# Patient Record
Sex: FEMALE | Race: WHITE | Employment: FULL TIME | URBAN - METROPOLITAN AREA
[De-identification: names, ages, dates, MRNs, and addresses within clinical notes are randomized per-mention and may not be internally consistent; named-entity substitution may affect disease eponyms.]

---

## 2017-10-18 ENCOUNTER — OP HISTORICAL/CONVERTED ENCOUNTER (OUTPATIENT)
Dept: OTHER | Age: 32
End: 2017-10-18

## 2017-11-01 ENCOUNTER — IP HISTORICAL/CONVERTED ENCOUNTER (OUTPATIENT)
Dept: OTHER | Age: 32
End: 2017-11-01

## 2017-11-20 ENCOUNTER — OFFICE VISIT (OUTPATIENT)
Dept: ENDOCRINOLOGY | Age: 32
End: 2017-11-20

## 2017-11-20 VITALS
WEIGHT: 156 LBS | DIASTOLIC BLOOD PRESSURE: 85 MMHG | HEIGHT: 65 IN | SYSTOLIC BLOOD PRESSURE: 127 MMHG | TEMPERATURE: 97.5 F | HEART RATE: 80 BPM | BODY MASS INDEX: 25.99 KG/M2 | RESPIRATION RATE: 16 BRPM | OXYGEN SATURATION: 97 %

## 2017-11-20 DIAGNOSIS — C73 THYROID CANCER (HCC): Primary | ICD-10-CM

## 2017-11-20 NOTE — MR AVS SNAPSHOT
Visit Information Date & Time Provider Department Dept. Phone Encounter #  
 11/20/2017 11:00 AM Earnest Anderson MD [facility] [phone] [id number] Upcoming Health Maintenance Date Due DTaP/Tdap/Td series (1 - Tdap) 11/6/2008 PAP AKA CERVICAL CYTOLOGY 11/6/2008 Influenza Age 5 to Adult 8/1/2017 Allergies as of 11/20/2017  Review Complete On: 11/20/2017 By: Earnest Anderson MD  
  
 Severity Noted Reaction Type Reactions Latex High 11/20/2017    Hives Bactrim [Sulfamethoprim] High 11/20/2017    Other (comments)  
 unresponsive Morphine High 11/20/2017    Nausea and Vomiting Current Immunizations  Never Reviewed No immunizations on file. Not reviewed this visit You Were Diagnosed With   
  
 Codes Comments [facility]    -  Primary ICD-10-CM: W80 ICD-9-CM: 276 Vitals BP Pulse Temp Resp Height(growth percentile) Weight(growth percentile) 127/85 (BP 1 Location: Right arm, BP Patient Position: Sitting) 80 97.5 °F (36.4 °C) (Oral) 16 5' 5\" (1.651 m) 156 lb (70.8 kg) SpO2 BMI OB Status Smoking Status 97% 25.96 kg/m2 Recent pregnancy Current Every Day Smoker Vitals History BMI and BSA Data Body Mass Index Body Surface Area  
 25.96 kg/m 2 1.8 m 2 Preferred Pharmacy Pharmacy Name Phone [id number] [address] [place] [address] [phone] Your Updated Medication List  
  
   
This list is accurate as of: 11/20/17 11:53 AM.  Always use your most recent med list.  
  
  
  
  
 PRENATAL AD PO Take  by mouth. Patient Instructions Synthroid   ? ???  mcg  A day, on empty stomach with water only, no other meds or food or drinks   For next half hour Take any kind of vitamins, calcium, iron   Pills  4 hours later Introducing [facility] & HEALTH SERVICES!    
 [employer] introduces [employer] patient portal. Now you can access parts of your medical record, email your doctor's office, and request medication refills online. 1. In your internet browser, go to [email or web address] 2. Click on the First Time User? Click Here link in the Sign In box. You will see the New Member Sign Up page. 3. Enter your [employer] Access Code exactly as it appears below. You will not need to use this code after youve completed the sign-up process. If you do not sign up before the expiration date, you must request a new code. · [employer] Access Code: [email or web address] Expires: 2/18/2018 11:53 AM 
 
4. Enter the last four digits of your Social Security Number (xxxx) and Date of Birth (mm/dd/yyyy) as indicated and click Submit. You will be taken to the next sign-up page. 5. Create a [employer] ID. This will be your [employer] login ID and cannot be changed, so think of one that is secure and easy to remember. 6. Create a [employer] password. You can change your password at any time. 7. Enter your Password Reset Question and Answer. This can be used at a later time if you forget your password. 8. Enter your e-mail address. You will receive e-mail notification when new information is available in [address] 9. Click Sign Up. You can now view and download portions of your medical record. 10. Click the Download Summary menu link to download a portable copy of your medical information. If you have questions, please visit the Frequently Asked Questions section of the [employer] website. Remember, [employer] is NOT to be used for urgent needs. For medical emergencies, dial 911. Now available from your iPhone and Android! Please provide this summary of care documentation to your next provider. If you have any questions after today's visit, please call [phone].

## 2017-11-20 NOTE — PATIENT INSTRUCTIONS
Synthroid   ? ???  mcg  A day, on empty stomach with water only, no other meds or food or drinks   For next half hour       Take any kind of vitamins, calcium, iron   Pills  4 hours later

## 2017-11-20 NOTE — PROGRESS NOTES
Chief Complaint   Patient presents with    Thyroid Problem     1. Have you been to the ER, urgent care clinic since your last visit? Hospitalized since your last visit? Yes, Child birth, 11/1/17, [facility]  2.  your last visit? Include any pap smears or colon screening.  No     Wt Readings from Last 3 Encounters:   11/20/17 156 lb (70.8 kg)     Temp Readings from Last 3 Encounters:   11/20/17 97.5 °F (36.4 °C) (Oral)     BP Readings from Last 3 Encounters:   11/20/17 127/85     Pulse Readings from Last 3 Encounters:   11/20/17 80

## 2017-11-20 NOTE — PROGRESS NOTES
HISTORY OF PRESENT ILLNESS  Baltazar Garvin is a 29 y.o. female. HPI  Initial visit for thyroid nodule / cancer management     She was found to have the nodule in nov 2016   She had biopsy on it which showed papillary thyroid cancer     Pt is accompanied by her . They brought in a flash drive with medical information  Path was not available on my review    She had biospies on LN , which are enlarged bilaterally, all of them are negative for Thyroglobulin   Suggested surgery in second trimester, but got postponed     She moved from [place] to [place] in July last week   She had her baby in Nov 1 st 2017     She is going in for surgery on DEC 5th by Dr. Yojana Cm   She is currently BREAST FEEDING         Past Medical History:   Diagnosis Date    Thyroid cancer [facility])        Social History     Social History    Marital status:      Spouse name: N/A    Number of children: N/A    Years of education: N/A     Occupational History    Not on file. Social History Main Topics    Smoking status: Current Every Day Smoker     Packs/day: 1.00    Smokeless tobacco: Never Used    Alcohol use No    Drug use: No    Sexual activity: Not on file     Other Topics Concern    Not on file     Social History Narrative    No narrative on file       Family History   Problem Relation Age of Onset    Ulcerative Colitis Mother     Diabetes Father     Hypertension Father     Elevated Lipids Father     Hypertension Maternal Grandmother     Cancer Maternal Grandmother     Cancer Maternal Grandfather            Review of Systems   Constitutional: Negative. HENT: Negative. Eyes: Negative for pain and redness. Respiratory: Negative. Cardiovascular: Negative for chest pain, palpitations and leg swelling. Gastrointestinal: Negative. Negative for constipation. Genitourinary: Negative. Musculoskeletal: Negative for myalgias. Skin: Negative. Neurological: Negative. Endo/Heme/Allergies: Negative. Psychiatric/Behavioral: Negative for depression and memory loss. The patient does not have insomnia. Physical Exam   Constitutional: She is oriented to person, place, and time. She appears well-developed and well-nourished. HENT:   Head: Normocephalic. Eyes: Conjunctivae and EOM are normal. Pupils are equal, round, and reactive to light. Neck: Normal range of motion. Neck supple. No JVD present. No tracheal deviation present. Thyromegaly (lymphnodes palpable too ) present. Cardiovascular: Normal rate, regular rhythm and normal heart sounds. No murmur heard. Pulmonary/Chest: Breath sounds normal.   Abdominal: Soft. Bowel sounds are normal.   Musculoskeletal: Normal range of motion. Lymphadenopathy:     She has no cervical adenopathy. Neurological: She is alert and oriented to person, place, and time. She has normal reflexes. Skin: Skin is warm. Psychiatric: She has a normal mood and affect. ASSESSMENT and PLAN    1. Thyroid cancer , Right side nodule  Of 3.3 cm by 1.8 cm by 1.8 cm, has increased vascularity and increased microcalcifications   Had FNA on nov 22 2016 -  PTC ( could nto review the path)  Had ct neck  Dec 27 2106 and usg reportes reviewed from oct 3 2016 and dec 1 2016     had FNA with TG wash out on bilateral lateral Level III  Lymph nodes - negative for cancer at [facility]     Going for total thyroidectomy with central compartment LN removal , prophylactic on Dec 5 2017 by Dr. Aurea Bernal for GRAY therapy or ablation because of breast feeding status   Will be starting on suppressive dose of levothyroxine     Will do further care based on surg path, risk of recurrence after she stops breast feeding   The management plan is discussed with her and her      Discussed possibility of hypocaclemia post surgery       2.  Family h/o  Thyroid cancer in maternal grandmother       F/u after surgery   > 50 % of time is spent on counseling   Patient voiced understanding her plan of care

## 2017-11-20 NOTE — LETTER
11/27/2017 9:59 AM 
 
Patient:  Prasanna Connor YOB: 1985 Date of Visit: 11/20/2017 Dear Farooq Drake, [address] [place] [id number] VIA Facsimile: [phone] 
 : 
 
 
Thank you for referring Ms. Prasanna Connor to me for evaluation/treatment. Below are the relevant portions of my assessment and plan of care. Chief Complaint Patient presents with  Thyroid Problem 1. Have you been to the ER, urgent care clinic since your last visit? Hospitalized since your last visit? Yes, Child birth, 11/1/17, [facility] 
2.  your last visit? Include any pap smears or colon screening. No  
 
Wt Readings from Last 3 Encounters:  
11/20/17 156 lb (70.8 kg) Temp Readings from Last 3 Encounters:  
11/20/17 97.5 °F (36.4 °C) (Oral) BP Readings from Last 3 Encounters:  
11/20/17 127/85 Pulse Readings from Last 3 Encounters:  
11/20/17 80 HISTORY OF PRESENT ILLNESS Prasanna Connor is a 29 y.o. female. HPI Initial visit for thyroid nodule / cancer management She was found to have the nodule in nov 2016 She had biopsy on it which showed papillary thyroid cancer Pt is accompanied by her . They brought in a flash drive with medical information Path was not available on my review She had biospies on LN , which are enlarged bilaterally, all of them are negative for Thyroglobulin Suggested surgery in second trimester, but got postponed She moved from [place] to [place] in July last week She had her baby in Nov 1 st 2017 She is going in for surgery on DEC 5th by Dr. Leigh Delgado  
She is currently BREAST FEEDING Past Medical History:  
Diagnosis Date  Thyroid cancer ([address] Social History Social History  Marital status:  Spouse name: N/A  
 Number of children: N/A  
 Years of education: N/A Occupational History  Not on file. Social History Main Topics  Smoking status: Current Every Day Smoker Packs/day: 1.00  Smokeless tobacco: Never Used  Alcohol use No  
 Drug use: No  
 Sexual activity: Not on file Other Topics Concern  Not on file Social History Narrative  No narrative on file Family History Problem Relation Age of Onset  Ulcerative Colitis Mother  Diabetes Father  Hypertension Father  Elevated Lipids Father  Hypertension Maternal Grandmother  Cancer Maternal Grandmother  Cancer Maternal Grandfather Review of Systems Constitutional: Negative. HENT: Negative. Eyes: Negative for pain and redness. Respiratory: Negative. Cardiovascular: Negative for chest pain, palpitations and leg swelling. Gastrointestinal: Negative. Negative for constipation. Genitourinary: Negative. Musculoskeletal: Negative for myalgias. Skin: Negative. Neurological: Negative. Endo/Heme/Allergies: Negative. Psychiatric/Behavioral: Negative for depression and memory loss. The patient does not have insomnia. Physical Exam  
Constitutional: She is oriented to person, place, and time. She appears well-developed and well-nourished. HENT:  
Head: Normocephalic. Eyes: Conjunctivae and EOM are normal. Pupils are equal, round, and reactive to light. Neck: Normal range of motion. Neck supple. No JVD present. No tracheal deviation present. Thyromegaly (lymphnodes palpable too ) present. Cardiovascular: Normal rate, regular rhythm and normal heart sounds. No murmur heard. Pulmonary/Chest: Breath sounds normal.  
Abdominal: Soft. Bowel sounds are normal.  
Musculoskeletal: Normal range of motion. Lymphadenopathy:  
  She has no cervical adenopathy. Neurological: She is alert and oriented to person, place, and time. She has normal reflexes. Skin: Skin is warm. Psychiatric: She has a normal mood and affect.   
 
 
ASSESSMENT and PLAN 
 
 1.  Thyroid cancer , Right side nodule  Of 3.3 cm by 1.8 cm by 1.8 cm, has increased vascularity and increased microcalcifications Had FNA on nov 22 2016 -  PTC ( could nto review the path) Had ct neck  Dec 27 2106 and usg reportes reviewed from oct 3 2016 and dec 1 2016  
 
had FNA with TG wash out on bilateral lateral Level III  Lymph nodes - negative for cancer at [facility] Going for total thyroidectomy with central compartment LN removal , prophylactic on Dec 5 2017 by Dr. Gaviota Dunn for GRAY therapy or ablation because of breast feeding status Will be starting on suppressive dose of levothyroxine Will do further care based on surg path, risk of recurrence after she stops breast feeding The management plan is discussed with her and her  Discussed possibility of hypocaclemia post surgery 2. Family h/o  Thyroid cancer in maternal grandmother F/u after surgery  
> 50 % of time is spent on counseling Patient voiced understanding her plan of care If you have questions, please do not hesitate to call me. I look forward to following Ms. Caldwell along with you. Sincerely, Dominique Solis MD

## 2017-12-04 ENCOUNTER — OP HISTORICAL/CONVERTED ENCOUNTER (OUTPATIENT)
Dept: OTHER | Age: 32
End: 2017-12-04

## 2017-12-05 ENCOUNTER — OP HISTORICAL/CONVERTED ENCOUNTER (OUTPATIENT)
Dept: OTHER | Age: 32
End: 2017-12-05

## 2017-12-12 ENCOUNTER — ED HISTORICAL/CONVERTED ENCOUNTER (OUTPATIENT)
Dept: OTHER | Age: 32
End: 2017-12-12

## 2018-01-05 ENCOUNTER — OFFICE VISIT (OUTPATIENT)
Dept: ENDOCRINOLOGY | Age: 33
End: 2018-01-05

## 2018-01-05 VITALS
HEART RATE: 70 BPM | BODY MASS INDEX: 26.08 KG/M2 | OXYGEN SATURATION: 96 % | DIASTOLIC BLOOD PRESSURE: 85 MMHG | SYSTOLIC BLOOD PRESSURE: 134 MMHG | WEIGHT: 156.5 LBS | RESPIRATION RATE: 16 BRPM | TEMPERATURE: 97.8 F | HEIGHT: 65 IN

## 2018-01-05 DIAGNOSIS — E89.0 POSTOPERATIVE HYPOTHYROIDISM: Primary | ICD-10-CM

## 2018-01-05 DIAGNOSIS — C73 THYROID CANCER (HCC): ICD-10-CM

## 2018-01-05 RX ORDER — LEVOTHYROXINE SODIUM 100 UG/1
TABLET ORAL
Refills: 0 | COMMUNITY
Start: 2017-11-22 | End: 2018-01-05 | Stop reason: DRUGHIGH

## 2018-01-05 RX ORDER — LEVOTHYROXINE SODIUM 125 UG/1
TABLET ORAL
Refills: 0 | COMMUNITY
Start: 2017-12-13 | End: 2018-01-05 | Stop reason: SDUPTHER

## 2018-01-05 RX ORDER — LEVOTHYROXINE SODIUM 125 UG/1
TABLET ORAL
Qty: 30 TAB | Refills: 6 | Status: SHIPPED | OUTPATIENT
Start: 2018-01-05 | End: 2018-01-08 | Stop reason: DRUGHIGH

## 2018-01-05 RX ORDER — DULOXETIN HYDROCHLORIDE 20 MG/1
20 CAPSULE, DELAYED RELEASE ORAL DAILY
COMMUNITY
End: 2018-06-19 | Stop reason: ALTCHOICE

## 2018-01-05 NOTE — PROGRESS NOTES
HISTORY OF PRESENT ILLNESS  Anna Dee is a 29 y.o. female. HPI  First f/u after initial visit for thyroid nodule / cancer management  From nov 2017     S/p surgery on --  Total thyroidectomy  Dec  5 2017     Started on synthroid 125 mcg post-op    still breast feeding     Hair loss is better per her after surgery          Old history :       She was found to have the nodule in nov 2016   She had biopsy on it which showed papillary thyroid cancer     Pt is accompanied by her . They brought in a flash drive with medical information  Path was not available on my review    She had biospies on LN , which are enlarged bilaterally, all of them are negative for Thyroglobulin   Suggested surgery in second trimester, but got postponed     She moved from [place] to [place] in July last week   She had her baby in Nov 1 st 2017     She is going in for surgery on DEC 5th by Dr. Shobha Villela   She is currently BREAST FEEDING         Past Medical History:   Diagnosis Date    Thyroid cancer [facility])        Social History     Social History    Marital status:      Spouse name: N/A    Number of children: N/A    Years of education: N/A     Occupational History    Not on file. Social History Main Topics    Smoking status: Current Every Day Smoker     Packs/day: 1.00    Smokeless tobacco: Never Used    Alcohol use No    Drug use: No    Sexual activity: Not on file     Other Topics Concern    Not on file     Social History Narrative       Family History   Problem Relation Age of Onset    Ulcerative Colitis Mother     Diabetes Father     Hypertension Father     Elevated Lipids Father     Hypertension Maternal Grandmother     Cancer Maternal Grandmother     Cancer Maternal Grandfather            Review of Systems   Constitutional: Negative. HENT: Negative. Eyes: Negative for pain and redness. Respiratory: Negative. Cardiovascular: Negative for chest pain, palpitations and leg swelling.    Gastrointestinal: Negative. Negative for constipation. Genitourinary: Negative. Musculoskeletal: Negative for myalgias. Skin: Negative. Neurological: Negative. Endo/Heme/Allergies: Negative. Psychiatric/Behavioral: Negative for depression and memory loss. The patient does not have insomnia. Physical Exam   Constitutional: She is oriented to person, place, and time. She appears well-developed and well-nourished. HENT:   Head: Normocephalic. Eyes: Conjunctivae and EOM are normal. Pupils are equal, round, and reactive to light. Neck: Normal range of motion. Neck supple. No JVD present. No tracheal deviation present. Thyromegaly (lymphnodes palpable too ) present. Cardiovascular: Normal rate, regular rhythm and normal heart sounds. No murmur heard. Pulmonary/Chest: Breath sounds normal.   Abdominal: Soft. Bowel sounds are normal.   Musculoskeletal: Normal range of motion. Lymphadenopathy:     She has no cervical adenopathy. Neurological: She is alert and oriented to person, place, and time. She has normal reflexes. Skin: Skin is warm. Psychiatric: She has a normal mood and affect. ASSESSMENT and PLAN    1. Hypothyroidism : post-operative   Started on synthroid 125 mcg on Dec 11 th   Will do labs and advise her on phone         2.  Thyroid cancer , Right side nodule  Of 3.3 cm by 1.8 cm by 1.8 cm, has increased vascularity and increased microcalcifications   Had FNA on nov 22 2016 -  PTC ( could nto review the path from old records)  Had ct neck  Dec 27 2106 and usg reportes reviewed from oct 3 2016 and dec 1 2016   had FNA with TG wash out on bilateral lateral Level III  Lymph nodes - negative for cancer at [facility]     s/p total thyroidectomy  on Dec 5 2017 by Dr. Deborah Mishra for GRAY therapy or ablation because of breast feeding status    started on suppressive dose of levothyroxine 125 mcg dose   Will get labs today     She did nto have any  hypocaclemia issues post surgery       2.  Family h/o  Thyroid cancer in maternal grandmother       F/u in 2-3 months   > 50 % of time is spent on counseling   Patient voiced understanding her plan of care

## 2018-01-05 NOTE — PROGRESS NOTES
Chief Complaint   Patient presents with    Thyroid Problem     1. Have you been to the ER, urgent care clinic since your last visit? Hospitalized since your last visit? No    2. Have you seen or consulted any other health care providers outside of the [facility] since your last visit? Include any pap smears or colon screening.  No     Wt Readings from Last 3 Encounters:   01/05/18 156 lb 8 oz (71 kg)   11/20/17 156 lb (70.8 kg)     Temp Readings from Last 3 Encounters:   01/05/18 97.8 °F (36.6 °C) (Oral)   11/20/17 97.5 °F (36.4 °C) (Oral)     BP Readings from Last 3 Encounters:   01/05/18 134/85   11/20/17 127/85     Pulse Readings from Last 3 Encounters:   01/05/18 70   11/20/17 80

## 2018-01-05 NOTE — PATIENT INSTRUCTIONS
Synthroid  125  mcg  A day, on empty stomach with water only, no other meds or food or drinks   For next half hour       Take any kind of vitamins, calcium, iron   Pills  4 hours later

## 2018-01-05 NOTE — MR AVS SNAPSHOT
Visit Information Date & Time Provider Department Dept. Phone Encounter #  
 1/5/2018 10:45 AM Monica Hastings MD [facility] Endocrinology [phone] [id number] Follow-up Instructions Return in about 2 months (around 3/19/2018). Upcoming Health Maintenance Date Due Pneumococcal 19-64 Highest Risk (1 of 3 - PCV13) 11/6/2006 DTaP/Tdap/Td series (1 - Tdap) 11/6/2008 PAP AKA CERVICAL CYTOLOGY 11/6/2008 Influenza Age 5 to Adult 8/1/2017 Allergies as of 1/5/2018  Review Complete On: 1/5/2018 By: Monica Hastings MD  
  
 Severity Noted Reaction Type Reactions Latex High 11/20/2017    Hives Bactrim [Sulfamethoprim] High 11/20/2017    Other (comments)  
 unresponsive Morphine High 11/20/2017    Nausea and Vomiting Current Immunizations  Never Reviewed No immunizations on file. Not reviewed this visit You Were Diagnosed With   
  
 Codes Comments Postoperative hypothyroidism    -  Primary ICD-10-CM: E89.0 ICD-9-CM: 244.0 Vitals BP Pulse Temp Resp Height(growth percentile) Weight(growth percentile) 134/85 (BP 1 Location: Right arm, BP Patient Position: Sitting) 70 97.8 °F (36.6 °C) (Oral) 16 5' 5\" (1.651 m) 156 lb 8 oz (71 kg) SpO2 BMI OB Status Smoking Status 96% 26.04 kg/m2 Recent pregnancy Current Every Day Smoker BMI and BSA Data Body Mass Index Body Surface Area 26.04 kg/m 2 1.8 m 2 Preferred Pharmacy Pharmacy Name Phone [id number] [address] [place] [address] [address] [place] [address] [phone] Your Updated Medication List  
  
   
This list is accurate as of: 1/5/18 11:34 AM.  Always use your most recent med list.  
  
  
  
  
 CYMBALTA 20 mg capsule Generic drug:  DULoxetine Take 20 mg by mouth daily. PRENATAL AD PO Take  by mouth. We Performed the Following METABOLIC PANEL, COMPREHENSIVE [81992 CPT(R)] T4, FREE [id number] CPT(R)] TSH 3RD GENERATION [66552 CPT(R)] Follow-up Instructions Return in about 2 months (around 3/19/2018). Patient Instructions Synthroid  125  mcg  A day, on empty stomach with water only, no other meds or food or drinks   For next half hour Take any kind of vitamins, calcium, iron   Pills  4 hours later Introducing [facility] & HEALTH SERVICES! [employer] introduces [employer] patient portal. Now you can access parts of your medical record, email your doctor's office, and request medication refills online. 1. In your internet browser, go to [email or web address] 2. Click on the First Time User? Click Here link in the Sign In box. You will see the New Member Sign Up page. 3. Enter your [employer] Access Code exactly as it appears below. You will not need to use this code after youve completed the sign-up process. If you do not sign up before the expiration date, you must request a new code. · [employer] Access Code: [email or web address] Expires: 2/18/2018 11:53 AM 
 
4. Enter the last four digits of your Social Security Number (xxxx) and Date of Birth (mm/dd/yyyy) as indicated and click Submit. You will be taken to the next sign-up page. 5. Create a [employer] ID. This will be your [employer] login ID and cannot be changed, so think of one that is secure and easy to remember. 6. Create a [employer] password. You can change your password at any time. 7. Enter your Password Reset Question and Answer. This can be used at a later time if you forget your password. 8. Enter your e-mail address. You will receive e-mail notification when new information is available in [address] 9. Click Sign Up. You can now view and download portions of your medical record. 10. Click the Download Summary menu link to download a portable copy of your medical information.  
 
If you have questions, please visit the Frequently Asked Questions section of the Index. Remember, [employer] is NOT to be used for urgent needs. For medical emergencies, dial 911. Now available from your iPhone and Android! Please provide this summary of care documentation to your next provider. Your primary care clinician is listed as Corina Davison. If you have any questions after today's visit, please call [phone].

## 2018-01-06 LAB
ALBUMIN SERPL-MCNC: 4.7 G/DL (ref 3.5–5.5)
ALBUMIN/GLOB SERPL: 1.7 {RATIO} (ref 1.2–2.2)
ALP SERPL-CCNC: 69 IU/L (ref 39–117)
ALT SERPL-CCNC: 16 IU/L (ref 0–32)
AST SERPL-CCNC: 17 IU/L (ref 0–40)
BILIRUB SERPL-MCNC: 0.2 MG/DL (ref 0–1.2)
BUN SERPL-MCNC: 8 MG/DL (ref 6–20)
BUN/CREAT SERPL: 13 (ref 9–23)
CALCIUM SERPL-MCNC: 9.4 MG/DL (ref 8.7–10.2)
CHLORIDE SERPL-SCNC: 101 MMOL/L (ref 96–106)
CO2 SERPL-SCNC: 24 MMOL/L (ref 18–29)
CREAT SERPL-MCNC: 0.62 MG/DL (ref 0.57–1)
GLOBULIN SER CALC-MCNC: 2.7 G/DL (ref 1.5–4.5)
GLUCOSE SERPL-MCNC: 82 MG/DL (ref 65–99)
POTASSIUM SERPL-SCNC: 4.3 MMOL/L (ref 3.5–5.2)
PROT SERPL-MCNC: 7.4 G/DL (ref 6–8.5)
SODIUM SERPL-SCNC: 142 MMOL/L (ref 134–144)
T4 FREE SERPL-MCNC: 1.19 NG/DL (ref 0.82–1.77)
TSH SERPL DL<=0.005 MIU/L-ACNC: 13.28 UIU/ML (ref 0.45–4.5)

## 2018-01-08 DIAGNOSIS — E89.0 POSTOPERATIVE HYPOTHYROIDISM: Primary | ICD-10-CM

## 2018-01-08 RX ORDER — LEVOTHYROXINE SODIUM 150 UG/1
150 TABLET ORAL
Qty: 30 TAB | Refills: 6 | Status: SHIPPED | OUTPATIENT
Start: 2018-01-08 | End: 2018-01-10 | Stop reason: DRUGHIGH

## 2018-01-10 DIAGNOSIS — E89.0 POSTSURGICAL HYPOTHYROIDISM: Primary | ICD-10-CM

## 2018-01-10 RX ORDER — LEVOTHYROXINE SODIUM 150 UG/1
150 TABLET ORAL
Qty: 30 TAB | Refills: 6 | Status: SHIPPED | OUTPATIENT
Start: 2018-01-10 | End: 2018-06-19 | Stop reason: SDUPTHER

## 2018-01-10 NOTE — TELEPHONE ENCOUNTER
Spoke with patient and informed her of lab results and dose change to medication she verbalized understanding.

## 2018-01-10 NOTE — TELEPHONE ENCOUNTER
----- Message from Cliff Jose MD sent at 1/8/2018  3:43 PM EST -----  Raising the synthrodi dose to 150 mcg a day   Stopping 125 mcg dose - inform pt please

## 2018-03-20 ENCOUNTER — OFFICE VISIT (OUTPATIENT)
Dept: ENDOCRINOLOGY | Age: 33
End: 2018-03-20

## 2018-03-20 VITALS
HEIGHT: 65 IN | WEIGHT: 149.7 LBS | RESPIRATION RATE: 20 BRPM | TEMPERATURE: 97.9 F | HEART RATE: 77 BPM | BODY MASS INDEX: 24.94 KG/M2 | SYSTOLIC BLOOD PRESSURE: 109 MMHG | OXYGEN SATURATION: 100 % | DIASTOLIC BLOOD PRESSURE: 77 MMHG

## 2018-03-20 DIAGNOSIS — C73 THYROID CANCER (HCC): Primary | ICD-10-CM

## 2018-03-20 DIAGNOSIS — E89.0 POSTOPERATIVE HYPOTHYROIDISM: ICD-10-CM

## 2018-03-20 RX ORDER — IBUPROFEN 800 MG/1
TABLET ORAL AS NEEDED
Refills: 0 | COMMUNITY
Start: 2018-03-19 | End: 2021-01-19 | Stop reason: ALTCHOICE

## 2018-03-20 NOTE — PATIENT INSTRUCTIONS
Synthroid  150  mcg  A day, on empty stomach with water only, no other meds or food or drinks   For next half hour       Take any kind of vitamins, calcium, iron   Pills  4 hours later

## 2018-03-20 NOTE — PROGRESS NOTES
HISTORY OF PRESENT ILLNESS  Sagar Chanel is a 29 y.o. female. HPI  f/u after last  visit for thyroid nodule / cancer management  From jan 5 2018     Lost 7 lbs     Stopped breast feeding   Increase synthroid dose to 150 mcg on vianney 10 2018     Pt denies any family h/o thyroid problems   She denies any hyper or hypothyroid symptoms     She denies any prior irradiations or exposure to contrast             Prior history :    S/p surgery on --  Total thyroidectomy  Dec  5 2017     Started on synthroid 125 mcg post-op    still breast feeding     Hair loss is better per her after surgery          Old history :       She was found to have the nodule in nov 2016   She had biopsy on it which showed papillary thyroid cancer     Pt is accompanied by her . They brought in a flash drive with medical information  Path was not available on my review    She had biospies on LN , which are enlarged bilaterally, all of them are negative for Thyroglobulin   Suggested surgery in second trimester, but got postponed     She moved from [place] to [place] in July last week   She had her baby in Nov 1 st 2017     She is going in for surgery on DEC 5th by Dr. Ji Fuentes   She is currently BREAST FEEDING         Past Medical History:   Diagnosis Date    Thyroid cancer [facility])        Social History     Social History    Marital status:      Spouse name: N/A    Number of children: N/A    Years of education: N/A     Occupational History    Not on file.      Social History Main Topics    Smoking status: Current Every Day Smoker     Packs/day: 1.00    Smokeless tobacco: Never Used    Alcohol use No    Drug use: No    Sexual activity: Not on file     Other Topics Concern    Not on file     Social History Narrative       Family History   Problem Relation Age of Onset    Ulcerative Colitis Mother     Diabetes Father     Hypertension Father     Elevated Lipids Father     Hypertension Maternal Grandmother     Cancer Maternal Grandmother  Cancer Maternal Grandfather            Review of Systems   Constitutional: Negative. HENT: Negative. Eyes: Negative for pain and redness. Respiratory: Negative. Cardiovascular: Negative for chest pain, palpitations and leg swelling. Gastrointestinal: Negative. Negative for constipation. Genitourinary: Negative. Musculoskeletal: Negative for myalgias. Skin: Negative. Neurological: Negative. Endo/Heme/Allergies: Negative. Psychiatric/Behavioral: Negative for depression and memory loss. The patient does not have insomnia. Physical Exam   Constitutional: She is oriented to person, place, and time. She appears well-developed and well-nourished. HENT:   Head: Normocephalic. Eyes: Conjunctivae and EOM are normal. Pupils are equal, round, and reactive to light. Neck: Normal range of motion. Neck supple. No JVD present. No tracheal deviation present. Thyromegaly (lymphnodes palpable too ) present. Cardiovascular: Normal rate, regular rhythm and normal heart sounds. No murmur heard. Pulmonary/Chest: Breath sounds normal.   Abdominal: Soft. Bowel sounds are normal.   Musculoskeletal: Normal range of motion. Lymphadenopathy:     She has no cervical adenopathy. Neurological: She is alert and oriented to person, place, and time. She has normal reflexes. Skin: Skin is warm. Psychiatric: She has a normal mood and affect. Lab Results  Component Value Date/Time   TSH 0.950 03/13/2018 02:27 PM   T4, Free 1.68 03/13/2018 02:27 PM            ASSESSMENT and PLAN    1. Hypothyroidism : post-operative   Started on synthroid 125 mcg on Dec 11 th   Increased the dose to  150 mcg on jan 10th       2.  Thyroid cancer , Right side nodule  Of 3.3 cm by 1.8 cm by 1.8 cm, has increased vascularity and increased microcalcifications   Had FNA on nov 22 2016 -  PTC ( could nto review the path from old records)  Had ct neck  Dec 27 2106 and usg reportes reviewed from oct 3 2016 and dec 1 2016   had FNA with TG wash out on bilateral lateral Level III  Lymph nodes - negative for cancer at [facility]     s/p total thyroidectomy  on Dec 5 2017 by Dr. Everette Hadley   Reviewed surgical pathology :  Tumor is multifocal ( right lobe 2.5 cm and isthmus 1 mm )  Usual PTC features     Did not  plan for GRAY therapy or ablation because of breast feeding status   Now, she has stopped breast feeding      started on suppressive dose of levothyroxine 125 mcg dose, increased to 150 mcg dose   And  She has suppressed TG marker level, TSH of 0.9     Given that pt has all  features of  LOW risk for recurrence, will not pursue GRAY ablation therapy and pt is agreeable to it       2.  Family h/o  Thyroid cancer in maternal grandmother       F/u in 2-3 months   > 50 % of time is spent on counseling   Patient voiced understanding her plan of care

## 2018-03-20 NOTE — PROGRESS NOTES
Wt Readings from Last 3 Encounters:   03/20/18 149 lb 11.2 oz (67.9 kg)   01/05/18 156 lb 8 oz (71 kg)   11/20/17 156 lb (70.8 kg)     Temp Readings from Last 3 Encounters:   03/20/18 97.9 °F (36.6 °C) (Oral)   01/05/18 97.8 °F (36.6 °C) (Oral)   11/20/17 97.5 °F (36.4 °C) (Oral)     BP Readings from Last 3 Encounters:   03/20/18 109/77   01/05/18 134/85   11/20/17 127/85     Pulse Readings from Last 3 Encounters:   03/20/18 77   01/05/18 70   11/20/17 80

## 2018-03-20 NOTE — MR AVS SNAPSHOT
[address] [address] 
[phone] Patient: Johana Pan MRN: [id number] :1985 Visit Information Date & Time Provider Department Dept. Phone Encounter #  
 3/20/2018 11:00 AM Marcus Delgado MD [facility] Diabetes & Endocrinology [phone] [id number] Follow-up Instructions Return in about 3 months (around 2018). Upcoming Health Maintenance Date Due Pneumococcal 19-64 Highest Risk (1 of 3 - PCV13) 2006 DTaP/Tdap/Td series (1 - Tdap) 2008 PAP AKA CERVICAL CYTOLOGY 2008 Influenza Age 5 to Adult 2017 Allergies as of 3/20/2018  Review Complete On: 3/20/2018 By: Marcus Delgado MD  
  
 Severity Noted Reaction Type Reactions Latex High 2017    Hives Bactrim [Sulfamethoprim] High 2017    Other (comments)  
 unresponsive Morphine High 2017    Nausea and Vomiting Current Immunizations  Never Reviewed No immunizations on file. Not reviewed this visit You Were Diagnosed With   
  
 Codes Comments [facility]    -  Primary ICD-10-CM: Z35 ICD-9-CM: 611 Vitals BP Pulse Temp Resp Height(growth percentile) Weight(growth percentile) 109/77 (BP 1 Location: Right arm, BP Patient Position: Sitting) 77 97.9 °F (36.6 °C) (Oral) 20 5' 5\" (1.651 m) 149 lb 11.2 oz (67.9 kg) SpO2 BMI OB Status Smoking Status 100% 24.91 kg/m2 Recent pregnancy Current Every Day Smoker Vitals History BMI and BSA Data Body Mass Index Body Surface Area 24.91 kg/m 2 1.76 m 2 Preferred Pharmacy Pharmacy Name Phone [id number] [facility] [address] [place] [address] [phone] Your Updated Medication List  
  
   
This list is accurate as of 3/20/18 12:05 PM.  Always use your most recent med list.  
  
  
  
  
 CYMBALTA 20 mg capsule Generic drug:  DULoxetine Take 20 mg by mouth daily. ibuprofen 800 mg tablet Commonly known as:  MOTRIN  
  
 levothyroxine 150 mcg tablet Commonly known as:  SYNTHROID Take 1 Tab by mouth Daily (before breakfast). Stop 125 mcg dose. PRENATAL AD PO Take  by mouth. Follow-up Instructions Return in about 3 months (around 6/20/2018). To-Do List   
 03/20/2018 Imaging:  US THYROID/PARATHYROID/SOFT TISS Patient Instructions Synthroid  150  mcg  A day, on empty stomach with water only, no other meds or food or drinks   For next half hour Take any kind of vitamins, calcium, iron   Pills  4 hours later Introducing \[facility] [place] [facility]\"" & HEALTH SERVICES! [employer] introduces [employer] patient portal. Now you can access parts of your medical record, email your doctor's office, and request medication refills online. 1. In your internet browser, go to [email or web address] 2. Click on the First Time User? Click Here link in the Sign In box. You will see the New Member Sign Up page. 3. Enter your [employer] Access Code exactly as it appears below. You will not need to use this code after youve completed the sign-up process. If you do not sign up before the expiration date, you must request a new code. · [employer] Access Code: [email or web address] Expires: 6/18/2018 12:03 PM 
 
4. Enter the last four digits of your Social Security Number (xxxx) and Date of Birth (mm/dd/yyyy) as indicated and click Submit. You will be taken to the next sign-up page. 5. Create a [employer] ID. This will be your [employer] login ID and cannot be changed, so think of one that is secure and easy to remember. 6. Create a [employer] password. You can change your password at any time. 7. Enter your Password Reset Question and Answer. This can be used at a later time if you forget your password. 8. Enter your e-mail address. You will receive e-mail notification when new information is available in [address] 9. Click Sign Up. You can now view and download portions of your medical record. 10. Click the Download Summary menu link to download a portable copy of your medical information. If you have questions, please visit the Frequently Asked Questions section of the [employer] website. Remember, [employer] is NOT to be used for urgent needs. For medical emergencies, dial 911. Now available from your iPhone and Android! Please provide this summary of care documentation to your next provider. Your primary care clinician is listed as Corina Carmen. If you have any questions after today's visit, please call [phone].

## 2018-04-02 ENCOUNTER — HOSPITAL ENCOUNTER (OUTPATIENT)
Dept: ULTRASOUND IMAGING | Age: 33
Discharge: HOME | End: 2018-04-02
Attending: INTERNAL MEDICINE
Payer: COMMERCIAL

## 2018-04-02 DIAGNOSIS — C73 THYROID CANCER (HCC): ICD-10-CM

## 2018-04-02 PROCEDURE — 76536 US EXAM OF HEAD AND NECK: CPT

## 2018-04-06 NOTE — PROGRESS NOTES
Informed patient of ultrasound results. Patient verbalized understanding. No further questions or comments voiced.

## 2018-05-02 ENCOUNTER — DOCUMENTATION ONLY (OUTPATIENT)
Dept: ENDOCRINOLOGY | Age: 33
End: 2018-05-02

## 2018-05-02 NOTE — PROGRESS NOTES
I called her mobile and left message that I am not considering any further treatments ( I meant GRAY ablation for thyroid cancer)  As all looks fine, will leave off the treatment     Cameron Hernandez MD

## 2018-06-19 ENCOUNTER — OFFICE VISIT (OUTPATIENT)
Dept: ENDOCRINOLOGY | Age: 33
End: 2018-06-19

## 2018-06-19 VITALS
OXYGEN SATURATION: 100 % | SYSTOLIC BLOOD PRESSURE: 105 MMHG | RESPIRATION RATE: 14 BRPM | BODY MASS INDEX: 25.92 KG/M2 | HEIGHT: 65 IN | WEIGHT: 155.6 LBS | DIASTOLIC BLOOD PRESSURE: 68 MMHG | HEART RATE: 93 BPM

## 2018-06-19 DIAGNOSIS — E89.0 POSTSURGICAL HYPOTHYROIDISM: Primary | ICD-10-CM

## 2018-06-19 DIAGNOSIS — C73 THYROID CANCER (HCC): ICD-10-CM

## 2018-06-19 RX ORDER — LEVOTHYROXINE SODIUM 150 UG/1
150 TABLET ORAL
Qty: 30 TAB | Refills: 6 | Status: SHIPPED | OUTPATIENT
Start: 2018-06-19 | End: 2018-09-25 | Stop reason: DRUGHIGH

## 2018-06-19 NOTE — MR AVS SNAPSHOT
[address] [facility] [address] 
[phone] Patient: Rafael Marrero MRN: [id number] :1985 Visit Information Date & Time Provider Department Dept. Phone Encounter #  
 2018  2:30 PM Coral Perea MD [facility] Diabetes & Endocrinology [phone] [id number] Follow-up Instructions Return in about 3 months (around 2018). Upcoming Health Maintenance Date Due Pneumococcal 19-64 Highest Risk (1 of 3 - PCV13) 2006 DTaP/Tdap/Td series (1 - Tdap) 2008 PAP AKA CERVICAL CYTOLOGY 2008 Influenza Age 5 to Adult 2018 Allergies as of 2018  Review Complete On: 2018 By: Coral Perea MD  
  
 Severity Noted Reaction Type Reactions Latex High 2017    Hives Bactrim [Sulfamethoprim] High 2017    Other (comments)  
 unresponsive Morphine High 2017    Nausea and Vomiting Current Immunizations  Never Reviewed No immunizations on file. Not reviewed this visit You Were Diagnosed With   
  
 Codes Comments Postsurgical hypothyroidism    -  Primary ICD-10-CM: E89.0 ICD-9-CM: 280. 0 Thyroid cancer [facility])     ICD-10-CM: M51 ICD-9-CM: 206 Vitals BP Pulse Resp Height(growth percentile) Weight(growth percentile) SpO2  
 105/68 (BP 1 Location: Left arm, BP Patient Position: Sitting) 93 14 5' 5\" (1.651 m) 155 lb 9.6 oz (70.6 kg) 100% BMI OB Status Smoking Status 25.89 kg/m2 Having regular periods Current Every Day Smoker Vitals History BMI and BSA Data Body Mass Index Body Surface Area  
 25.89 kg/m 2 1.8 m 2 Preferred Pharmacy Pharmacy Name Phone [id number] [address] [place] [address] [address] [place] [address] [phone] Your Updated Medication List  
  
   
This list is accurate as of 18  2:43 PM.  Always use your most recent med list.  
  
  
  
  
 ibuprofen 800 mg tablet Commonly known as:  MOTRIN  
  
 levothyroxine 150 mcg tablet Commonly known as:  SYNTHROID Take 1 Tab by mouth Daily (before breakfast). Stop 125 mcg dose. Follow-up Instructions Return in about 3 months (around 9/19/2018). Patient Instructions Synthroid  150  mcg  A day, on empty stomach with water only, no other meds or food or drinks   For next half hour Take any kind of vitamins, calcium, iron   Pills  4 hours later Introducing [facility] & HEALTH SERVICES! [facility] introduces [employer] patient portal. Now you can access parts of your medical record, email your doctor's office, and request medication refills online. 1. In your internet browser, go to [email or web address] 2. Click on the First Time User? Click Here link in the Sign In box. You will see the New Member Sign Up page. 3. Enter your [employer] Access Code exactly as it appears below. You will not need to use this code after youve completed the sign-up process. If you do not sign up before the expiration date, you must request a new code. · [employer] Access Code: [email or web address] Expires: 9/17/2018  2:43 PM 
 
4. Enter the last four digits of your Social Security Number (xxxx) and Date of Birth (mm/dd/yyyy) as indicated and click Submit. You will be taken to the next sign-up page. 5. Create a [employer] ID. This will be your [employer] login ID and cannot be changed, so think of one that is secure and easy to remember. 6. Create a [employer] password. You can change your password at any time. 7. Enter your Password Reset Question and Answer. This can be used at a later time if you forget your password. 8. Enter your e-mail address. You will receive e-mail notification when new information is available in [address] 9. Click Sign Up. You can now view and download portions of your medical record.  
10. Click the Download Summary menu link to download a portable copy of your medical information. If you have questions, please visit the Frequently Asked Questions section of the [employer] website. Remember, [employer] is NOT to be used for urgent needs. For medical emergencies, dial 911. Now available from your iPhone and Android! Please provide this summary of care documentation to your next provider. Your primary care clinician is listed as Corina Walton. If you have any questions after today's visit, please call [phone].

## 2018-06-19 NOTE — PROGRESS NOTES
HISTORY OF PRESENT ILLNESS  Rafael Marrero is a 29 y.o. female. HPI  f/u after last  visit for hypothyroidism and thyroid cancer management  From March 20 2018     Gained 6  lbs   Stopped breast feeding     No hyper or hypothryodi  Symptoms             Old hsitory   Increase synthroid dose to 150 mcg on vianney 10 2018     Pt denies any family h/o thyroid problems   She denies any hyper or hypothyroid symptoms     She denies any prior irradiations or exposure to contrast             Prior history :    S/p surgery on --  Total thyroidectomy  Dec  5 2017     Started on synthroid 125 mcg post-op    still breast feeding     Hair loss is better per her after surgery          Old history :       She was found to have the nodule in nov 2016   She had biopsy on it which showed papillary thyroid cancer     Pt is accompanied by her . They brought in a flash drive with medical information  Path was not available on my review    She had biospies on LN , which are enlarged bilaterally, all of them are negative for Thyroglobulin   Suggested surgery in second trimester, but got postponed     She moved from [place] to [facility] in July last week   She had her baby in Nov 1 st 2017     She is going in for surgery on DEC 5th by Dr. Griffin Saenz   She is currently BREAST FEEDING         Past Medical History:   Diagnosis Date    Thyroid cancer [facility])        Social History     Social History    Marital status:      Spouse name: N/A    Number of children: N/A    Years of education: N/A     Occupational History    Not on file.      Social History Main Topics    Smoking status: Current Every Day Smoker     Packs/day: 1.00    Smokeless tobacco: Never Used    Alcohol use No    Drug use: No    Sexual activity: Not on file     Other Topics Concern    Not on file     Social History Narrative       Family History   Problem Relation Age of Onset    Ulcerative Colitis Mother     Diabetes Father     Hypertension Father     Elevated Lipids Father     Hypertension Maternal Grandmother     Cancer Maternal Grandmother     Cancer Maternal Grandfather            Review of Systems   Constitutional: Negative. HENT: Negative. Eyes: Negative for pain and redness. Respiratory: Negative. Cardiovascular: Negative for chest pain, palpitations and leg swelling. Gastrointestinal: Negative. Negative for constipation. Genitourinary: Negative. Musculoskeletal: Negative for myalgias. Skin: Negative. Neurological: Negative. Endo/Heme/Allergies: Negative. Psychiatric/Behavioral: Negative for depression and memory loss. The patient does not have insomnia. Physical Exam   Constitutional: She is oriented to person, place, and time. She appears well-developed and well-nourished. HENT:   Head: Normocephalic. Eyes: Conjunctivae and EOM are normal. Pupils are equal, round, and reactive to light. Neck: Normal range of motion. Neck supple. No JVD present. No tracheal deviation present. S/p surgery no thyromegaly  present. Cardiovascular: Normal rate, regular rhythm and normal heart sounds. No murmur heard. Pulmonary/Chest: Breath sounds normal.   Abdominal: Soft. Bowel sounds are normal.   Musculoskeletal: Normal range of motion. Lymphadenopathy:     She has no cervical adenopathy. Neurological: She is alert and oriented to person, place, and time. She has normal reflexes. Skin: Skin is warm. Psychiatric: She has a normal mood and affect. Lab Results   Component Value Date/Time    TSH 10.820 (H) 06/12/2018 08:57 AM    T4, Free 1.04 06/12/2018 08:57 AM            ASSESSMENT and PLAN    1. Hypothyroidism : post-operative   Started on synthroid 125 mcg on Dec 11 th   Increased the dose to  150 mcg on jan 10th 2018         2.  Thyroid cancer , Right side nodule  Of 3.3 cm by 1.8 cm by 1.8 cm, has increased vascularity and increased microcalcifications   Had FNA on nov 22 2016 -  PTC ( could nto review the path from old records)  Had ct neck  Dec 27 2106 and usg reportes reviewed from oct 3 2016 and dec 1 2016   had FNA with TG wash out on bilateral lateral Level III  Lymph nodes - negative for cancer at [facility]     s/p total thyroidectomy  on Dec 5 2017 by Dr. Elisa Homans   Reviewed surgical pathology :  Tumor is multifocal ( right lobe 2.5 cm and isthmus 1 mm )  Usual PTC features     Did not  plan for GRAY therapy or ablation because of breast feeding status   Now, she has stopped breast feeding      started on suppressive dose of levothyroxine 125 mcg dose, increased to 150 mcg dose   And  She has suppressed TG marker level, TSH of 0.9     Given that pt has all  features of  LOW risk for recurrence, will not pursue GRAY ablation therapy and pt is agreeable to it       2.  Family h/o  Thyroid cancer in maternal grandmother       F/u in 2-3 months   > 50 % of time is spent on counseling   Patient voiced understanding her plan of care

## 2018-06-19 NOTE — PROGRESS NOTES
Tai Lo is a 29 y.o. female    Chief Complaint   Patient presents with    Thyroid Problem     f/up       1. Have you been to the ER, urgent care clinic since your last visit? Hospitalized since your last visit? No    2. Have you seen or consulted any other health care providers outside of the [facility] since your last visit? Include any pap smears or colon screening.  No     Wt Readings from Last 3 Encounters:   06/19/18 155 lb 9.6 oz (70.6 kg)   03/20/18 149 lb 11.2 oz (67.9 kg)   01/05/18 156 lb 8 oz (71 kg)     Temp Readings from Last 3 Encounters:   03/20/18 97.9 °F (36.6 °C) (Oral)   01/05/18 97.8 °F (36.6 °C) (Oral)   11/20/17 97.5 °F (36.4 °C) (Oral)     BP Readings from Last 3 Encounters:   06/19/18 105/68   03/20/18 109/77   01/05/18 134/85     Pulse Readings from Last 3 Encounters:   06/19/18 93   03/20/18 77   01/05/18 70

## 2018-07-17 ENCOUNTER — ED HISTORICAL/CONVERTED ENCOUNTER (OUTPATIENT)
Dept: OTHER | Age: 33
End: 2018-07-17

## 2018-09-25 ENCOUNTER — DOCUMENTATION ONLY (OUTPATIENT)
Dept: ENDOCRINOLOGY | Age: 33
End: 2018-09-25

## 2018-09-25 ENCOUNTER — OFFICE VISIT (OUTPATIENT)
Dept: ENDOCRINOLOGY | Age: 33
End: 2018-09-25

## 2018-09-25 VITALS
BODY MASS INDEX: 27.22 KG/M2 | HEART RATE: 80 BPM | SYSTOLIC BLOOD PRESSURE: 106 MMHG | RESPIRATION RATE: 18 BRPM | WEIGHT: 163.4 LBS | OXYGEN SATURATION: 98 % | HEIGHT: 65 IN | TEMPERATURE: 97.6 F | DIASTOLIC BLOOD PRESSURE: 69 MMHG

## 2018-09-25 DIAGNOSIS — E89.0 POSTOPERATIVE HYPOTHYROIDISM: Primary | ICD-10-CM

## 2018-09-25 DIAGNOSIS — C73 THYROID CANCER (HCC): ICD-10-CM

## 2018-09-25 RX ORDER — LEVOTHYROXINE SODIUM 175 UG/1
TABLET ORAL
Qty: 90 TAB | Refills: 4 | Status: SHIPPED | OUTPATIENT
Start: 2018-09-25 | End: 2019-01-29 | Stop reason: DRUGHIGH

## 2018-09-25 NOTE — PROGRESS NOTES
1. Have you been to the ER, urgent care clinic since your last visit? No  Hospitalized since your last visit? No 
 
2. Have you seen or consulted any other health care providers outside of the [facility] since your last visit? No 
 
 
Wt Readings from Last 3 Encounters:  
09/25/18 163 lb 6.4 oz (74.1 kg) 06/19/18 155 lb 9.6 oz (70.6 kg) 03/20/18 149 lb 11.2 oz (67.9 kg) Temp Readings from Last 3 Encounters:  
09/25/18 97.6 °F (36.4 °C) (Oral) 03/20/18 97.9 °F (36.6 °C) (Oral) 01/05/18 97.8 °F (36.6 °C) (Oral) BP Readings from Last 3 Encounters:  
09/25/18 106/69  
06/19/18 105/68  
03/20/18 109/77 Pulse Readings from Last 3 Encounters:  
09/25/18 80  
06/19/18 93  
03/20/18 77

## 2018-09-25 NOTE — PATIENT INSTRUCTIONS
-------------------------------------------------------------------------------------------- Refills    -    please call your pharmacy and have them send us a refill request 
 
Results  -  allow up to a week for lab results to be processed and reviewed. Phone calls  -  Allow upto 24 hrs. for non-urgent calls to be retained Prior authorization - It may take up to 2 weeks to process, depending on your insurance Forms  -  FMLA, DMV, patient assistance, etc. will take up to 2 weeks to process Cancellations - please notify the office in advance if you cannot keep your appointment Samples  - will only be dispensed at visits as supply is limited If you are having a medical emergency call 911 
 
-------------------------------------------------------------------------------------------- Increase Synthroid  175   mcg  A day, on empty stomach with water only, no other meds or food or drinks   For next half hour , stop 150 mcg dose Take any kind of vitamins, calcium, iron   Pills  4 hours later ( take an extra pill of 150 on sundays   And finish up  )

## 2018-09-25 NOTE — MR AVS SNAPSHOT
[address] [facility] [address] 
[phone] Patient: Leonardo Sanchez MRN: [id number] :[id number] Visit Information Date & Time Provider Department Dept. Phone Encounter #  
 2018 10:00 AM Lolita Olivas MD [facility] [phone] [id number] Follow-up Instructions Return in about 4 months (around 2019). Upcoming Health Maintenance Date Due Pneumococcal 19-64 Highest Risk (1 of 3 - PCV13) 2006 DTaP/Tdap/Td series (1 - Tdap) 2008 PAP AKA CERVICAL CYTOLOGY 2008 Influenza Age 5 to Adult 2018 Allergies as of 2018  Review Complete On: 2018 By: Lolita Olivas MD  
  
 Severity Noted Reaction Type Reactions Latex High 2017    Hives Bactrim [Sulfamethoprim] High 2017    Other (comments)  
 unresponsive Morphine High 2017    Nausea and Vomiting Current Immunizations  Never Reviewed No immunizations on file. Not reviewed this visit You Were Diagnosed With   
  
 Codes Comments Postoperative hypothyroidism    -  Primary ICD-10-CM: E89.0 ICD-9-CM: 752. 0 Thyroid cancer [facility])     ICD-10-CM: R64 ICD-9-CM: 804 Vitals BP Pulse Temp Resp Height(growth percentile) Weight(growth percentile) 106/69 (BP 1 Location: Left arm, BP Patient Position: Sitting) 80 97.6 °F (36.4 °C) (Oral) 18 5' 5\" (1.651 m) 163 lb 6.4 oz (74.1 kg) LMP SpO2 BMI OB Status Smoking Status 2018 98% 27.19 kg/m2 Having regular periods Current Every Day Smoker Vitals History BMI and BSA Data Body Mass Index Body Surface Area  
 27.19 kg/m 2 1.84 m 2 Preferred Pharmacy Pharmacy Name Phone [id number] [address] [place] [address] [address] [place] [address] [phone] Your Updated Medication List  
  
   
This list is accurate as of 18 10:54 AM.  Always use your most recent med list.  
  
  
  
  
 ibuprofen 800 mg tablet Commonly known as:  MOTRIN Follow-up Instructions Return in about 4 months (around 1/25/2019). Patient Instructions   
-------------------------------------------------------------------------------------------- Refills    -    please call your pharmacy and have them send us a refill request 
 
Results  -  allow up to a week for lab results to be processed and reviewed. Phone calls  -  Allow upto 24 hrs. for non-urgent calls to be retained Prior authorization - It may take up to 2 weeks to process, depending on your insurance Forms  -  FMLA, DMV, patient assistance, etc. will take up to 2 weeks to process Cancellations - please notify the office in advance if you cannot keep your appointment Samples  - will only be dispensed at visits as supply is limited If you are having a medical emergency call 911 
 
-------------------------------------------------------------------------------------------- Increase Synthroid  175   mcg  A day, on empty stomach with water only, no other meds or food or drinks   For next half hour , stop 150 mcg dose Take any kind of vitamins, calcium, iron   Pills  4 hours later ( take an extra pill of 150 on sundays   And finish up  ) Introducing [facility]! [facility] introduces Get-n-Post patient portal. Now you can access parts of your medical record, email your doctor's office, and request medication refills online. 1. In your internet browser, go to [email or web address] 2. Click on the First Time User? Click Here link in the Sign In box. You will see the New Member Sign Up page. 3. Enter your Get-n-Post Access Code exactly as it appears below. You will not need to use this code after youve completed the sign-up process. If you do not sign up before the expiration date, you must request a new code.  
 
· Get-n-Post Access Code: [email or web address] 
 Expires: 12/24/2018 10:54 AM 
 
4. Enter the last four digits of your Social Security Number (xxxx) and Date of Birth (mm/dd/yyyy) as indicated and click Submit. You will be taken to the next sign-up page. 5. Create a [facility] ID. This will be your [facility] login ID and cannot be changed, so think of one that is secure and easy to remember. 6. Create a [facility] [employer] password. You can change your password at any time. 7. Enter your Password Reset Question and Answer. This can be used at a later time if you forget your password. 8. Enter your e-mail address. You will receive e-mail notification when new information is available in [address] 9. Click Sign Up. You can now view and download portions of your medical record. 10. Click the Download Summary menu link to download a portable copy of your medical information. If you have questions, please visit the Frequently Asked Questions section of the [employer] website. Remember, [facility] is NOT to be used for urgent needs. For medical emergencies, dial 911. Now available from your iPhone and Android! Please provide this summary of care documentation to your next provider. Your primary care clinician is listed as Corina Galvan. If you have any questions after today's visit, please call [phone].

## 2018-09-25 NOTE — PROGRESS NOTES
HISTORY OF PRESENT ILLNESS Amari Kelsey is a 29 y.o. female. HPI 
f/u after last  visit for hypothyroidism and thyroid cancer management  From June 2018 Gained 8   lbs No intention to get pregnancy Feels sluggish Claims compliance with 150 mcg dose of synthrodi [facility] Increase synthroid dose to 150 mcg on vianney 10 2018 Pt denies any family h/o thyroid problems She denies any hyper or hypothyroid symptoms She denies any prior irradiations or exposure to contrast  
 
 
 
 
 
Prior history : 
 
S/p surgery on --  Total thyroidectomy  Dec  5 2017 Started on synthroid 125 mcg post-op 
 
still breast feeding Hair loss is better per her after surgery Old history :  
 
 
She was found to have the nodule in nov 2016 She had biopsy on it which showed papillary thyroid cancer Pt is accompanied by her . They brought in a flash drive with medical information Path was not available on my review She had biospies on LN , which are enlarged bilaterally, all of them are negative for Thyroglobulin Suggested surgery in second trimester, but got postponed She moved from [place] [address] [address] [facility] in July last week She had her baby in Nov 1 st 2017 She is going in for surgery on DEC 5th by Dr. Renetta Quinones  
She is currently BREAST FEEDING Past Medical History:  
Diagnosis Date  Thyroid cancer ([facility] [address] Social History Social History  Marital status:  Spouse name: N/A  
 Number of children: N/A  
 Years of education: N/A Occupational History  Not on file. Social History Main Topics  Smoking status: Current Every Day Smoker Packs/day: 1.00  Smokeless tobacco: Never Used  Alcohol use No  
 Drug use: No  
 Sexual activity: Not on file Other Topics Concern  Not on file Social History Narrative Family History Problem Relation Age of Onset  Ulcerative Colitis Mother  Diabetes Father  Hypertension Father  Elevated Lipids Father  Hypertension Maternal Grandmother  Cancer Maternal Grandmother  Cancer Maternal Grandfather Review of Systems Constitutional: Negative. HENT: Negative. Eyes: Negative for pain and redness. Respiratory: Negative. Cardiovascular: Negative for chest pain, palpitations and leg swelling. Gastrointestinal: Negative. Negative for constipation. Genitourinary: Negative. Musculoskeletal: Negative for myalgias. Skin: Negative. Neurological: Negative. Endo/Heme/Allergies: Negative. Psychiatric/Behavioral: Negative for depression and memory loss. The patient does not have insomnia. Physical Exam  
Constitutional: She is oriented to person, place, and time. She appears well-developed and well-nourished. HENT:  
Head: Normocephalic. Eyes: Conjunctivae and EOM are normal. Pupils are equal, round, and reactive to light. Neck: Normal range of motion. Neck supple. No JVD present. No tracheal deviation present. S/p surgery no thyromegaly  present. Cardiovascular: Normal rate, regular rhythm and normal heart sounds. No murmur heard. Pulmonary/Chest: Breath sounds normal.  
Abdominal: Soft. Bowel sounds are normal.  
Musculoskeletal: Normal range of motion. Lymphadenopathy:  
  She has no cervical adenopathy. Neurological: She is alert and oriented to person, place, and time. She has normal reflexes. Skin: Skin is warm. Psychiatric: She has a normal mood and affect. Lab Results Component Value Date/Time TSH 19.670 (H) 09/18/2018 10:36 AM  
 T4, Free 0.97 09/18/2018 10:36 AM  
  
 
 
 
ASSESSMENT and PLAN 1. Hypothyroidism : post-operative Started on synthroid 125 mcg on Dec 11 th Increased the dose to  150 mcg on jan 10th 2018 TSH is 10  - did not change the dose in may 2018 TSH is 20 -  Went up higher Checked pharmacy refills and she is delaying every month by 10 days to obtain synthroid In any case, I am increasing the dose to 175 mcg a day Emphasized on compliance  And educated her the importance of suppressed TSH 2. Thyroid cancer , Right side nodule  Of 3.3 cm by 1.8 cm by 1.8 cm, has increased vascularity and increased microcalcifications Had FNA on nov 22 2016 -  PTC ( could nto review the path from old records) Had ct neck  Dec 27 2106 and usg reportes reviewed from oct 3 2016 and dec 1 2016  
had FNA with TG wash out on bilateral lateral Level III  Lymph nodes - negative for cancer at [facility]  
 
s/p total thyroidectomy  on Dec 5 2017 by Dr. Jesus Fagan  
Reviewed surgical pathology :  Tumor is multifocal ( right lobe 2.5 cm and isthmus 1 mm ) Usual PTC features Did not  plan for GRAY therapy or ablation because of breast feeding status Now, she has stopped breast feeding  
 
 started on suppressive dose of levothyroxine 125 mcg dose, increased to 150 mcg dose in jan 2018 And  She has suppressed TG marker level, TSH of 0.9 Given that pt has all  features of  LOW risk for recurrence, will not pursue GRAY ablation therapy and pt is agreeable to it 2. Family h/o  Thyroid cancer in maternal grandmother F/u in 2-3 months  
> 50 % of time is spent on counseling Patient voiced understanding her plan of care

## 2018-09-25 NOTE — PROGRESS NOTES
Per pharmacy rep patient has had SYNTHROID filled (150mg) on the following dates:  1/8/18  2/10/18  3/19/18  4/28/18  5/19/18  6/19/18  8/1/18  9/13/18

## 2018-10-24 ENCOUNTER — ED HISTORICAL/CONVERTED ENCOUNTER (OUTPATIENT)
Dept: OTHER | Age: 33
End: 2018-10-24

## 2019-01-29 ENCOUNTER — OFFICE VISIT (OUTPATIENT)
Dept: ENDOCRINOLOGY | Age: 34
End: 2019-01-29

## 2019-01-29 VITALS
HEIGHT: 65 IN | HEART RATE: 77 BPM | TEMPERATURE: 96.8 F | WEIGHT: 159.9 LBS | SYSTOLIC BLOOD PRESSURE: 119 MMHG | DIASTOLIC BLOOD PRESSURE: 63 MMHG | OXYGEN SATURATION: 99 % | BODY MASS INDEX: 26.64 KG/M2 | RESPIRATION RATE: 18 BRPM

## 2019-01-29 DIAGNOSIS — E89.0 POSTOPERATIVE HYPOTHYROIDISM: ICD-10-CM

## 2019-01-29 DIAGNOSIS — C73 THYROID CANCER (HCC): Primary | ICD-10-CM

## 2019-01-29 RX ORDER — LEVOTHYROXINE SODIUM 150 UG/1
150 TABLET ORAL
Qty: 30 TAB | Refills: 6 | Status: SHIPPED | OUTPATIENT
Start: 2019-01-29 | End: 2019-05-28

## 2019-01-29 NOTE — PROGRESS NOTES
HISTORY OF PRESENT ILLNESS Rafael Marrero is a 34 y.o. female. HPI 
f/u after last  visit for hypothyroidism and thyroid cancer management  From sept 25 2018 Lost 4   lbs No intention to get pregnancy Feels good Claims compliance with 175  mcg dose of synthrodi [facility] Increase synthroid dose to 150 mcg on vianney 10 2018 Pt denies any family h/o thyroid problems She denies any hyper or hypothyroid symptoms She denies any prior irradiations or exposure to contrast  
 
 
 
 
 
Prior history : 
 
S/p surgery on --  Total thyroidectomy  Dec  5 2017 Started on synthroid 125 mcg post-op 
 
still breast feeding Hair loss is better per her after surgery Old history :  
 
 
She was found to have the nodule in nov 2016 She had biopsy on it which showed papillary thyroid cancer Pt is accompanied by her . They brought in a flash drive with medical information Path was not available on my review She had biospies on LN , which are enlarged bilaterally, all of them are negative for Thyroglobulin Suggested surgery in second trimester, but got postponed She moved from [place] to [place] in July last week She had her baby in Nov 1 st 2017 She is going in for surgery on DEC 5th by Dr. Griffin Saenz  
She is currently BREAST FEEDING Past Medical History:  
Diagnosis Date  Thyroid cancer ([facility] [address] Social History Socioeconomic History  Marital status:  Spouse name: Not on file  Number of children: Not on file  Years of education: Not on file  Highest education level: Not on file Social Needs  Financial resource strain: Not on file  Food insecurity - worry: Not on file  Food insecurity - inability: Not on file  Transportation needs - medical: Not on file  Transportation needs - non-medical: Not on file Occupational History  Not on file Tobacco Use  Smoking status: Current Every Day Smoker Packs/day: 1.00  Smokeless tobacco: Never Used Substance and Sexual Activity  Alcohol use: No  
 Drug use: No  
 Sexual activity: Not on file Other Topics Concern  Not on file Social History Narrative  Not on file Family History Problem Relation Age of Onset  Ulcerative Colitis Mother  Diabetes Father  Hypertension Father  Elevated Lipids Father  Hypertension Maternal Grandmother  Cancer Maternal Grandmother  Cancer Maternal Grandfather Review of Systems Constitutional: Negative. HENT: Negative. Eyes: Negative for pain and redness. Respiratory: Negative. Cardiovascular: Negative for chest pain, palpitations and leg swelling. Gastrointestinal: Negative. Negative for constipation. Genitourinary: Negative. Musculoskeletal: Negative for myalgias. Skin: Negative. Neurological: Negative. Endo/Heme/Allergies: Negative. Psychiatric/Behavioral: Negative for depression and memory loss. The patient does not have insomnia. Physical Exam  
Constitutional: She is oriented to person, place, and time. She appears well-developed and well-nourished. HENT:  
Head: Normocephalic. Eyes: Conjunctivae and EOM are normal. Pupils are equal, round, and reactive to light. Neck: Normal range of motion. Neck supple. No JVD present. No tracheal deviation present. S/p surgery no thyromegaly  present. Cardiovascular: Normal rate, regular rhythm and normal heart sounds. No murmur heard. Pulmonary/Chest: Breath sounds normal.  
Abdominal: Soft. Bowel sounds are normal.  
Musculoskeletal: Normal range of motion. Lymphadenopathy:  
  She has no cervical adenopathy. Neurological: She is alert and oriented to person, place, and time. She has normal reflexes. Skin: Skin is warm. Psychiatric: She has a normal mood and affect. Lab Results Component Value Date/Time  TSH 0.069 (L) 01/22/2019 09:38 AM  
 T4, Free 1.45 01/22/2019 09:38 AM  
  
 
 
 
ASSESSMENT and PLAN 1. Hypothyroidism : post-operative Started on synthroid 125 mcg on Dec 11 th Increased the dose to  150 mcg on jan 10th 2018 TSH is 10  - did not change the dose in may 2018 TSH is 20 -  Went up higher Checked pharmacy refills and she is delaying every month by 10 days to obtain synthroid In any case, I am increasing the dose to 175 mcg a day Emphasized on compliance  And educated her the importance of suppressed TSH 2. Thyroid cancer , Right side nodule  Of 3.3 cm by 1.8 cm by 1.8 cm, has increased vascularity and increased microcalcifications Had FNA on nov 22 2016 -  PTC ( could nto review the path from old records) Had ct neck  Dec 27 2106 and usg reportes reviewed from oct 3 2016 and dec 1 2016  
had FNA with TG wash out on bilateral lateral Level III  Lymph nodes - negative for cancer at [facility]  
 
s/p total thyroidectomy  on Dec 5 2017 by Dr. Chu Cornejo  
Reviewed surgical pathology :  Tumor is multifocal ( right lobe 2.5 cm and isthmus 1 mm ) Usual PTC features Did not  plan for GRAY therapy or ablation because of breast feeding status Now, she has stopped breast feeding Given that pt has all  features of  LOW risk for recurrence, will not pursue GRAY ablation therapy and pt is agreeable to it  
 
 started on suppressive dose of levothyroxine 125 mcg dose, increased to 150 mcg dose in jan 2018 June 2018   :  TSH is 10  - continued on 150 mcg dose  
 
sept 2018  :  TSH is 19  -  Noticing non-compliance and educated her to be compliant  increased to 175 mcg a day Jan 2019  : TSH is suppressed - on 175 mcg a day Decreasing dose to 150 mcg a day She changed to working shifts and I advised her to take it between 5- 8 am   
TG marker is negative Will do usg of neck 2. Family h/o  Thyroid cancer in maternal grandmother F/u in 2-3 months > 50 % of time is spent on counseling Patient voiced understanding her plan of care

## 2019-01-29 NOTE — PATIENT INSTRUCTIONS
-------------------------------------------------------------------------------------------- Refills    -    please call your pharmacy and have them send us a refill request 
 
Results  -  allow up to a week for lab results to be processed and reviewed. Phone calls  -  Allow upto 24 hrs. for non-urgent calls to be retained Prior authorization - It may take up to 2 weeks to process, depending on your insurance Forms  -  FMLA, DMV, patient assistance, etc. will take up to 2 weeks to process Cancellations - please notify the office in advance if you cannot keep your appointment Samples  - will only be dispensed at visits as supply is limited If you are having a medical emergency call 911 
 
-------------------------------------------------------------------------------------------- 
decrease  To  UNIthroid  150   mcg  A day, BMN   on empty stomach with water only, no other meds or food or drinks   For next half hour , stop 175 mcg synthroid dose Take any kind of vitamins, calcium, iron   Pills  4 hours later

## 2019-02-05 ENCOUNTER — HOSPITAL ENCOUNTER (OUTPATIENT)
Dept: ULTRASOUND IMAGING | Age: 34
Discharge: HOME | End: 2019-02-05
Attending: INTERNAL MEDICINE
Payer: COMMERCIAL

## 2019-02-05 DIAGNOSIS — E89.0 POSTOPERATIVE HYPOTHYROIDISM: ICD-10-CM

## 2019-02-05 DIAGNOSIS — C73 THYROID CANCER (HCC): ICD-10-CM

## 2019-02-05 PROCEDURE — 76536 US EXAM OF HEAD AND NECK: CPT

## 2019-05-21 DIAGNOSIS — E89.0 POSTOPERATIVE HYPOTHYROIDISM: ICD-10-CM

## 2019-05-21 DIAGNOSIS — C73 THYROID CANCER (HCC): ICD-10-CM

## 2019-05-22 LAB
T4 FREE SERPL-MCNC: 1.53 NG/DL (ref 0.82–1.77)
TSH SERPL DL<=0.005 MIU/L-ACNC: 0.2 UIU/ML (ref 0.45–4.5)

## 2019-05-28 ENCOUNTER — OFFICE VISIT (OUTPATIENT)
Dept: ENDOCRINOLOGY | Age: 34
End: 2019-05-28

## 2019-05-28 VITALS
OXYGEN SATURATION: 99 % | TEMPERATURE: 98.2 F | BODY MASS INDEX: 26.82 KG/M2 | HEIGHT: 65 IN | WEIGHT: 161 LBS | DIASTOLIC BLOOD PRESSURE: 79 MMHG | SYSTOLIC BLOOD PRESSURE: 109 MMHG | HEART RATE: 82 BPM | RESPIRATION RATE: 18 BRPM

## 2019-05-28 DIAGNOSIS — C73 THYROID CANCER (HCC): ICD-10-CM

## 2019-05-28 DIAGNOSIS — E89.0 POSTOPERATIVE HYPOTHYROIDISM: Primary | ICD-10-CM

## 2019-05-28 RX ORDER — LEVOTHYROXINE SODIUM 150 UG/1
150 TABLET ORAL
Qty: 90 TAB | Refills: 4 | Status: SHIPPED | OUTPATIENT
Start: 2019-05-28 | End: 2019-09-12 | Stop reason: SDUPTHER

## 2019-05-28 NOTE — PATIENT INSTRUCTIONS
--------------------------------------------------------------------------------------------    Refills    -    please call your pharmacy and have them send us a refill request    Results  -  allow up to a week for lab results to be processed and reviewed. Phone calls  -  Allow upto 24 hrs.  for non-urgent calls to be retained    Prior authorization - It may take up to 2 weeks to process, depending on your insurance    Forms  -  FMLA, DMV, patient assistance, etc. will take up to 2 weeks to process    Cancellations - please notify the office in advance if you cannot keep your appointment    Samples  - will only be dispensed at visits as supply is limited      If you are having a medical emergency call 911    --------------------------------------------------------------------------------------------    UNIthroid  150   mcg  A day, BMN   on empty stomach with water only, no other meds or food or drinks   For next half hour     Take any kind of vitamins, calcium, iron   Pills  4 hours later      ----------------------------------------------------------------------------------------------    THYROGEN - [address]

## 2019-05-28 NOTE — PROGRESS NOTES
HISTORY OF PRESENT ILLNESS  Jillian Figueroa is a 34 y.o. female. HPI  f/u after last  visit for hypothyroidism and thyroid cancer management  From jan 2019       [facility] FEB 2019  , SHE HAD   COMPLIANT WITH UNITHROID, BUT IT COULD BE GENERIC   Feels good           Old history      Lost 4   lbs   No intention to get pregnancy     Feels good   Claims compliance with 175  mcg dose of synthrodi         Old hsitory   Increase synthroid dose to 150 mcg on vianney 10 2018     Pt denies any family h/o thyroid problems   She denies any hyper or hypothyroid symptoms     She denies any prior irradiations or exposure to contrast             Prior history :    S/p surgery on --  Total thyroidectomy  Dec  5 2017     Started on synthroid 125 mcg post-op    still breast feeding     Hair loss is better per her after surgery          Old history :       She was found to have the nodule in nov 2016   She had biopsy on it which showed papillary thyroid cancer     Pt is accompanied by her .  They brought in a flash drive with medical information  Path was not available on my review    She had biospies on LN , which are enlarged bilaterally, all of them are negative for Thyroglobulin   Suggested surgery in second trimester, but got postponed     She moved from [place] to [place] in July last week   She had her baby in Nov 1 st 2017     She is going in for surgery on DEC 5th by Dr. Yamile Roy   She is currently BREAST FEEDING         Past Medical History:   Diagnosis Date    Thyroid cancer [facility])        Social History     Socioeconomic History    Marital status:      Spouse name: Not on file    Number of children: Not on file    Years of education: Not on file    Highest education level: Not on file   Occupational History    Not on file   Social Needs    Financial resource strain: Not on file    Food insecurity:     Worry: Not on file     Inability: Not on file    Transportation needs:     Medical: Not on file     Non-medical: Not on file Tobacco Use    Smoking status: Current Every Day Smoker     Packs/day: 1.00    Smokeless tobacco: Never Used   Substance and Sexual Activity    Alcohol use: No    Drug use: No    Sexual activity: Not on file   Lifestyle    Physical activity:     Days per week: Not on file     Minutes per session: Not on file    Stress: Not on file   Relationships    Social connections:     Talks on phone: Not on file     Gets together: Not on file     Attends Yazdanism service: Not on file     Active member of club or organization: Not on file     Attends meetings of clubs or organizations: Not on file     Relationship status: Not on file    Intimate partner violence:     Fear of current or ex partner: Not on file     Emotionally abused: Not on file     Physically abused: Not on file     Forced sexual activity: Not on file   Other Topics Concern    Not on file   Social History Narrative    Not on file       Family History   Problem Relation Age of Onset    Ulcerative Colitis Mother     Diabetes Father     Hypertension Father     Elevated Lipids Father     Hypertension Maternal Grandmother     Cancer Maternal Grandmother     Cancer Maternal Grandfather            Review of Systems   Constitutional: Negative. HENT: Negative. Eyes: Negative for pain and redness. Respiratory: Negative. Cardiovascular: Negative for chest pain, palpitations and leg swelling. Gastrointestinal: Negative. Negative for constipation. Genitourinary: Negative. Musculoskeletal: Negative for myalgias. Skin: Negative. Neurological: Negative. Endo/Heme/Allergies: Negative. Psychiatric/Behavioral: Negative for depression and memory loss. The patient does not have insomnia. Physical Exam   Constitutional: She is oriented to person, place, and time. She appears well-developed and well-nourished. HENT:   Head: Normocephalic. Eyes: Conjunctivae and EOM are normal. Pupils are equal, round, and reactive to light. Neck: Normal range of motion. Neck supple. No JVD present. No tracheal deviation present. S/p surgery no thyromegaly  present. Cardiovascular: Normal rate, regular rhythm and normal heart sounds. No murmur heard. Pulmonary/Chest: Breath sounds normal.   Abdominal: Soft. Bowel sounds are normal.   Musculoskeletal: Normal range of motion. Lymphadenopathy:     She has no cervical adenopathy. Neurological: She is alert and oriented to person, place, and time. She has normal reflexes. Skin: Skin is warm. Psychiatric: She has a normal mood and affect. Lab Results   Component Value Date/Time    TSH 0.197 (L) 05/21/2019 04:25 PM    T4, Free 1.53 05/21/2019 04:25 PM            ASSESSMENT and PLAN    1. Hypothyroidism : post-operative   Started on synthroid 125 mcg on Dec 11 th   Increased the dose to  150 mcg on jan 10th 2018   TSH is 10  - did not change the dose in may 2018   TSH is 20 -  Went up higher    Checked pharmacy refills and she is delaying every month by 10 days to obtain synthroid   In any case, I increased the dose to 175 mcg a day     sept 2018  :  TSH is 19  -  Noticing non-compliance and educated her to be compliant  increased to 175 mcg a day     Jan 2019  : TSH is suppressed - on 175 mcg a day   Decreased  dose to 150 mcg a day   She changed to working shifts and I advised her to take it between 5- 8 am    TG marker is negative   Negative for recurrence on   usg of neck     May 2019  -   TSH is 0.1  - CONTINUE  MCG A DAY PEDRITO JACQUES EMPHASIZED           2.  Thyroid cancer , Right side nodule  Of 3.3 cm by 1.8 cm by 1.8 cm, has increased vascularity and increased microcalcifications   Had FNA on nov 22 2016 -  PTC ( could nto review the path from old records)  Had ct neck  Dec 27 2106 and usg reportes reviewed from oct 3 2016 and dec 1 2016   had FNA with TG wash out on bilateral lateral Level III  Lymph nodes - negative for cancer at [facility]     s/p total thyroidectomy  on Dec 5 2017 by Dr. Jarvis Blakely   Reviewed surgical pathology :  Tumor is multifocal ( right lobe 2.5 cm and isthmus 1 mm )  Usual PTC features     Did not  plan for GRAY therapy or ablation because of breast feeding status   Now, she has stopped breast feeding     Given that pt has all  features of  LOW risk for recurrence, will not pursue GRAY ablation therapy and pt is agreeable to it   started on suppressive dose of levothyroxine, but pt had non compliance issues and shift working style     TG <0.1  In  Jan 2019 - but TSH was suppressed to 0.65  usg  Feb 2019 - negative for recurrence       Will plan for stimulated TSH TG calculation with THYROGEN        2.  Family h/o  Thyroid cancer in maternal grandmother       F/u in 2-3 months   > 50 % of time is spent on counseling   Patient voiced understanding her plan of care

## 2019-05-28 NOTE — PROGRESS NOTES
Yo Barnes is a 34 y.o. female    Chief Complaint   Patient presents with    Thyroid Problem     follow up       1. Have you been to the ER, urgent care clinic since your last visit? Hospitalized since your last visit? No  M  2. Have you seen or consulted any other health care providers outside of the [address] [place] [address] since your last visit? Include any pap smears or colon screening.  No      Visit Vitals  /79 (BP 1 Location: Right arm, BP Patient Position: Sitting)   Pulse 82   Temp 98.2 °F (36.8 °C) (Oral)   Resp 18   Ht 5' 5\" (1.651 m)   Wt 161 lb (73 kg)   SpO2 99%   BMI 26.79 kg/m²           Health Maintenance Due   Topic Date Due    Pneumococcal 0-64 years (1 of 1 - PPSV23) 11/06/1993    DTaP/Tdap/Td series (1 - Tdap) 11/06/2008    PAP AKA CERVICAL CYTOLOGY  11/06/2008     Wt Readings from Last 3 Encounters:   05/28/19 161 lb (73 kg)   01/29/19 159 lb 14.4 oz (72.5 kg)   09/25/18 163 lb 6.4 oz (74.1 kg)     Temp Readings from Last 3 Encounters:   05/28/19 98.2 °F (36.8 °C) (Oral)   01/29/19 96.8 °F (36 °C) (Oral)   09/25/18 97.6 °F (36.4 °C) (Oral)     BP Readings from Last 3 Encounters:   05/28/19 109/79   01/29/19 119/63   09/25/18 106/69     Pulse Readings from Last 3 Encounters:   05/28/19 82   01/29/19 77   09/25/18 80

## 2019-05-28 NOTE — LETTER
5/28/19 Patient: Yo Barnes YOB: 1985 Date of Visit: 5/28/2019 Chantale Hall NP 
[address] [facility] [address] [facility] [address] VIA Facsimile: [phone] Dear Chantale Hall NP, Thank you for referring Ms. Yo Barnes to [id number] [place] [address] for evaluation. My notes for this consultation are attached. If you have questions, please do not hesitate to call me. I look forward to following your patient along with you. Sincerely, Bon He MD

## 2019-09-12 RX ORDER — LEVOTHYROXINE SODIUM 150 UG/1
150 TABLET ORAL
Qty: 90 TAB | Refills: 4 | Status: SHIPPED | OUTPATIENT
Start: 2019-09-12 | End: 2020-03-03 | Stop reason: SDUPTHER

## 2020-02-20 ENCOUNTER — TELEPHONE (OUTPATIENT)
Dept: ENDOCRINOLOGY | Age: 35
End: 2020-02-20

## 2020-02-20 DIAGNOSIS — E89.0 POSTOPERATIVE HYPOTHYROIDISM: Primary | ICD-10-CM

## 2020-02-25 ENCOUNTER — HOSPITAL ENCOUNTER (OUTPATIENT)
Dept: LAB | Age: 35
Discharge: HOME | End: 2020-02-25

## 2020-02-25 DIAGNOSIS — E89.0 POSTOPERATIVE HYPOTHYROIDISM: ICD-10-CM

## 2020-02-25 LAB
T4 FREE SERPL-MCNC: 1.1 NG/DL (ref 0.8–1.5)
TSH SERPL DL<=0.05 MIU/L-ACNC: 1.63 UIU/ML (ref 0.36–3.74)

## 2020-03-03 ENCOUNTER — OFFICE VISIT (OUTPATIENT)
Dept: ENDOCRINOLOGY | Age: 35
End: 2020-03-03

## 2020-03-03 VITALS
HEART RATE: 87 BPM | TEMPERATURE: 96.4 F | WEIGHT: 165 LBS | DIASTOLIC BLOOD PRESSURE: 67 MMHG | BODY MASS INDEX: 27.49 KG/M2 | SYSTOLIC BLOOD PRESSURE: 120 MMHG | OXYGEN SATURATION: 98 % | RESPIRATION RATE: 18 BRPM | HEIGHT: 65 IN

## 2020-03-03 DIAGNOSIS — C73 THYROID CANCER (HCC): ICD-10-CM

## 2020-03-03 DIAGNOSIS — E89.0 POSTOPERATIVE HYPOTHYROIDISM: Primary | ICD-10-CM

## 2020-03-03 RX ORDER — LEVOTHYROXINE SODIUM 150 UG/1
150 TABLET ORAL
Qty: 90 TAB | Refills: 3 | Status: SHIPPED | OUTPATIENT
Start: 2020-03-03 | End: 2021-03-11

## 2020-03-03 NOTE — PROGRESS NOTES
HISTORY OF PRESENT ILLNESS  Toby Breen is a 30 y.o. female. HPI  f/u after last  visit for hypothyroidism and thyroid cancer management  From [facility]   2019   F/u after 9 months     Compliant with synthroid       Old history     [facility] FEB 2019  , SHE HAD   COMPLIANT WITH UNITHROID, BUT IT COULD BE GENERIC   Feels good         Old history      Lost 4   lbs   No intention to get pregnancy   Feels good   Claims compliance with 175  mcg dose of synthrodi       Old hsitory   Increase synthroid dose to 150 mcg on vianney 10 2018     Pt denies any family h/o thyroid problems   She denies any hyper or hypothyroid symptoms   She denies any prior irradiations or exposure to contrast       Prior history :    S/p surgery on --  Total thyroidectomy  Dec  5 2017   Started on synthroid 125 mcg post-op  still breast feeding   Hair loss is better per her after surgery          Old history :       She was found to have the nodule in nov 2016   She had biopsy on it which showed papillary thyroid cancer     Pt is accompanied by her .  They brought in a flash drive with medical information  Path was not available on my review    She had biospies on LN , which are enlarged bilaterally, all of them are negative for Thyroglobulin   Suggested surgery in second trimester, but got postponed     She moved from [place] to [place] in July last week   She had her baby in Nov 1 st 2017     She is going in for surgery on DEC 5th by Dr. Travis Cortes   She is currently BREAST FEEDING         Past Medical History:   Diagnosis Date    Thyroid cancer [facility])        Social History     Socioeconomic History    Marital status:      Spouse name: Not on file    Number of children: Not on file    Years of education: Not on file    Highest education level: Not on file   Occupational History    Not on file   Social Needs    Financial resource strain: Not on file    Food insecurity:     Worry: Not on file     Inability: Not on file    Transportation needs: Medical: Not on file     Non-medical: Not on file   Tobacco Use    Smoking status: Current Every Day Smoker     Packs/day: 1.00    Smokeless tobacco: Never Used   Substance and Sexual Activity    Alcohol use: No    Drug use: No    Sexual activity: Not on file   Lifestyle    Physical activity:     Days per week: Not on file     Minutes per session: Not on file    Stress: Not on file   Relationships    Social connections:     Talks on phone: Not on file     Gets together: Not on file     Attends Christianity service: Not on file     Active member of club or organization: Not on file     Attends meetings of clubs or organizations: Not on file     Relationship status: Not on file    Intimate partner violence:     Fear of current or ex partner: Not on file     Emotionally abused: Not on file     Physically abused: Not on file     Forced sexual activity: Not on file   Other Topics Concern    Not on file   Social History Narrative    Not on file       Family History   Problem Relation Age of Onset    Ulcerative Colitis Mother     Diabetes Father     Hypertension Father     Elevated Lipids Father     Hypertension Maternal Grandmother     Cancer Maternal Grandmother     Cancer Maternal Grandfather            Review of Systems   Constitutional: Negative. HENT: Negative. Eyes: Negative for pain and redness. Respiratory: Negative. Cardiovascular: Negative for chest pain, palpitations and leg swelling. Gastrointestinal: Negative. Negative for constipation. Genitourinary: Negative. Musculoskeletal: Negative for myalgias. Skin: Negative. Neurological: Negative. Endo/Heme/Allergies: Negative. Psychiatric/Behavioral: Negative for depression and memory loss. The patient does not have insomnia. Physical Exam   Constitutional: She is oriented to person, place, and time. She appears well-developed and well-nourished. HENT:   Head: Normocephalic.    Eyes: Conjunctivae and EOM are normal. Pupils are equal, round, and reactive to light. Neck: Normal range of motion. Neck supple. No JVD present. No tracheal deviation present. S/p surgery no thyromegaly  present. Cardiovascular: Normal rate, regular rhythm and normal heart sounds. No murmur heard. Pulmonary/Chest: Breath sounds normal.   Abdominal: Soft. Bowel sounds are normal.   Musculoskeletal: Normal range of motion. Lymphadenopathy:     She has no cervical adenopathy. Neurological: She is alert and oriented to person, place, and time. She has normal reflexes. Skin: Skin is warm. Psychiatric: She has a normal mood and affect. Lab Results   Component Value Date/Time    TSH 1.63 02/25/2020 08:17 AM    T4, Free 1.1 02/25/2020 08:17 AM            ASSESSMENT and PLAN    1. Hypothyroidism : post-operative   Started on synthroid 125 mcg on Dec 11 th   Increased the dose to  150 mcg on jan 10th 2018   TSH is 10  - did not change the dose in may 2018   TSH is 20 -  Went up higher    Checked pharmacy refills and she is delaying every month by 10 days to obtain synthroid   In any case, I increased the dose to 175 mcg a day     Feb 2020  :  Euthyroid     sept 2018  :  TSH is 23  -  Noticing non-compliance and educated her to be compliant  increased to 175 mcg a day     Jan 2019  : TSH is suppressed - on 175 mcg a day   Decreased  dose to 150 mcg a day   She changed to working shifts and I advised her to take it between 5- 8 am    TG marker is negative   Negative for recurrence on   usg of neck     May 2019  -   TSH is 0.1  - CONTINUE  MCG A DAY Kendall Ferreira EMPHASIZED     Feb 2020  -  Euthyroid on 150 mcg dose of UNITHROID       2.  Thyroid cancer , Right side nodule  Of 3.3 cm by 1.8 cm by 1.8 cm, has increased vascularity and increased microcalcifications   Had FNA on nov 22 2016 -  PTC ( could nto review the path from old records)    Had ct neck  Dec 27 2106 and usg reportes reviewed from oct 3 2016 and dec 1 2016 had FNA with TG wash out on bilateral lateral Level III  Lymph nodes - negative for cancer at [facility]     s/p total thyroidectomy  on Dec 5 2017 by Dr. Ary Post   Reviewed surgical pathology :  Tumor is multifocal ( right lobe 2.5 cm and isthmus 1 mm )  Usual PTC features     Did not  plan for GRAY therapy or ablation because of breast feeding status   Now, she has stopped breast feeding     Given that pt has all  features of  LOW risk for recurrence, will not pursue GRAY ablation therapy and pt is agreeable to it   started on suppressive dose of levothyroxine, but pt had non compliance issues and shift working style     TG <0.1  In  Jan 2019 - but TSH was suppressed to 0.65  usg  Feb 2019 - negative for recurrence      feb 2020  : Will plan for stimulated TSH  And  TG calculation with THYROGEN  Ultrasound  To be done along with  WBS       2.  Family h/o  Thyroid cancer in maternal grandmother       F/u in 6  months   > 50 % of time is spent on counseling   Patient voiced understanding her plan of care

## 2020-03-03 NOTE — PROGRESS NOTES
Room 1    Identified pt with two pt identifiers(name and ). Reviewed record in preparation for visit and have obtained necessary documentation. All patient medications has been reviewed. Chief Complaint   Patient presents with    Thyroid Problem       Health Maintenance Due   Topic    Pneumococcal 0-64 years (1 of 1 - PPSV23)    DTaP/Tdap/Td series (1 - Tdap)    PAP AKA CERVICAL CYTOLOGY     Influenza Age 5 to Adult        Vitals:    20 0903   BP: 120/67   Pulse: 87   Resp: 18   Temp: 96.4 °F (35.8 °C)   TempSrc: Oral   SpO2: 98%   Weight: 165 lb (74.8 kg)   Height: 5' 5\" (1.651 m)   PainSc:   0 - No pain       Wt Readings from Last 3 Encounters:   20 165 lb (74.8 kg)   19 161 lb (73 kg)   19 159 lb 14.4 oz (72.5 kg)     Temp Readings from Last 3 Encounters:   20 96.4 °F (35.8 °C) (Oral)   19 98.2 °F (36.8 °C) (Oral)   19 96.8 °F (36 °C) (Oral)     BP Readings from Last 3 Encounters:   20 120/67   19 109/79   19 119/63     Pulse Readings from Last 3 Encounters:   20 87   19 82   19 77       No results found for: HBA1C, HGBE8, GLY3RUIG, CNO2CMQB, [id number]    Coordination of Care Questionnaire:   1) Have you been to an emergency room, urgent care, or hospitalized since your last visit?   no       2. Have seen or consulted any other health care provider since your last visit? NO    3) Do you have an Advanced Directive/ Living Will in place? NO  If yes, do we have a copy on file NO  If no, would you like information NO    Patient is accompanied by son I have received verbal consent from Ping Perkins to discuss any/all medical information while they are present in the room.

## 2020-03-03 NOTE — PATIENT INSTRUCTIONS
-------------------------------------------------------------------------------------------- Refills    -    please call your pharmacy and have them send us a refill request 
 
Results  -  allow up to a week for lab results to be processed and reviewed. Phone calls  -  Allow upto 24 hrs. for non-urgent calls to be retained Prior authorization - It may take up to 2 weeks to process, depending on your insurance Forms  -  FMLA, DMV, patient assistance, etc. will take up to 2 weeks to process Cancellations - please notify the office in advance if you cannot keep your appointment Samples  - will only be dispensed at visits as supply is limited If you are having a medical emergency call 911 
 
-------------------------------------------------------------------------------------------- UNIthroid  150   mcg  A day, BMN   on empty stomach with water only, no other meds or food or drinks   For next half hour Take any kind of vitamins, calcium, iron   Pills  4 hours later

## 2020-06-05 ENCOUNTER — ED HISTORICAL/CONVERTED ENCOUNTER (OUTPATIENT)
Dept: OTHER | Age: 35
End: 2020-06-05

## 2020-07-16 ENCOUNTER — TELEPHONE (OUTPATIENT)
Dept: ENDOCRINOLOGY | Age: 35
End: 2020-07-16

## 2020-07-16 DIAGNOSIS — E89.0 POSTOPERATIVE HYPOTHYROIDISM: Primary | ICD-10-CM

## 2020-07-16 DIAGNOSIS — E89.0 POSTOPERATIVE HYPOTHYROIDISM: ICD-10-CM

## 2020-07-16 DIAGNOSIS — C73 THYROID CANCER (HCC): ICD-10-CM

## 2020-07-16 NOTE — TELEPHONE ENCOUNTER
Order placed for pt per verbal order with read back from Dr. Brown November 07/16/20    Lab slips mailed

## 2020-09-02 ENCOUNTER — OFFICE VISIT (OUTPATIENT)
Dept: ENDOCRINOLOGY | Age: 35
End: 2020-09-02
Payer: COMMERCIAL

## 2020-09-02 VITALS
DIASTOLIC BLOOD PRESSURE: 94 MMHG | WEIGHT: 163.8 LBS | OXYGEN SATURATION: 99 % | RESPIRATION RATE: 18 BRPM | HEART RATE: 96 BPM | TEMPERATURE: 98.2 F | BODY MASS INDEX: 27.29 KG/M2 | HEIGHT: 65 IN | SYSTOLIC BLOOD PRESSURE: 131 MMHG

## 2020-09-02 DIAGNOSIS — C73 THYROID CANCER (HCC): ICD-10-CM

## 2020-09-02 DIAGNOSIS — E89.0 POSTOPERATIVE HYPOTHYROIDISM: Primary | ICD-10-CM

## 2020-09-02 LAB
T4 FREE SERPL-MCNC: 1.1 NG/DL (ref 0.8–1.5)
TSH SERPL DL<=0.05 MIU/L-ACNC: 0.81 UIU/ML (ref 0.36–3.74)

## 2020-09-02 PROCEDURE — 99215 OFFICE O/P EST HI 40 MIN: CPT | Performed by: INTERNAL MEDICINE

## 2020-09-02 RX ORDER — DESVENLAFAXINE 50 MG/1
TABLET, EXTENDED RELEASE ORAL
COMMUNITY
Start: 2020-08-20 | End: 2021-01-19 | Stop reason: ALTCHOICE

## 2020-09-02 RX ORDER — HYDROXYZINE PAMOATE 50 MG/1
CAPSULE ORAL
COMMUNITY
Start: 2020-07-13 | End: 2021-02-18 | Stop reason: SDUPTHER

## 2020-09-02 NOTE — PROGRESS NOTES
1. Have you been to the ER, urgent care clinic since your last visit? No  Hospitalized since your last visit? No    2. Have you seen or consulted any other health care providers outside of the [facility] since your last visit? Include any pap smears or colon screening.  No     Wt Readings from Last 3 Encounters:   09/02/20 163 lb 12.8 oz (74.3 kg)   03/03/20 165 lb (74.8 kg)   05/28/19 161 lb (73 kg)     Temp Readings from Last 3 Encounters:   09/02/20 98.2 °F (36.8 °C) (Oral)   03/03/20 96.4 °F (35.8 °C) (Oral)   05/28/19 98.2 °F (36.8 °C) (Oral)     BP Readings from Last 3 Encounters:   09/02/20 (!) 131/94   03/03/20 120/67   05/28/19 109/79     Pulse Readings from Last 3 Encounters:   09/02/20 96   03/03/20 87   05/28/19 82

## 2020-09-02 NOTE — PATIENT INSTRUCTIONS
-------------------------------------------------------------------------------------------- Refills    -    please call your pharmacy and have them send us a refill request 
 
Results  -  allow up to a week for lab results to be processed and reviewed. Phone calls  -  Allow upto 24 hrs. for non-urgent calls to be retained Prior authorization - It may take up to 2 weeks to process, depending on your insurance Forms  -  FMLA, DMV, patient assistance, etc. will take up to 2 weeks to process Cancellations - please notify the office in advance if you cannot keep your appointment Samples  - will only be dispensed at visits as supply is limited If you are having a medical emergency call 911 
 
-------------------------------------------------------------------------------------------- 
Unithroid  150   mcg  A day, BMN   on empty stomach with water only, no other meds or food or drinks   For next half hour Take any kind of vitamins, calcium, iron   Pills  4 hours later 
 
---------------------------------------------------------------------- [facility] labs Day 1 - shot at [facility]  ( Baseline just in case  ) Day 2 - shot at the [facility] Day 3- radioactive iodine pill Day 4 - break Day 5- whole body scan and labs

## 2020-09-02 NOTE — PROGRESS NOTES
HISTORY OF PRESENT ILLNESS  Skye Alvarado is a 30 y.o. female. HPI  f/u after last  visit for hypothyroidism and thyroid cancer management  From March 3 2020     Lost 2 lbs   Compliant with synthroid 150 mcg a day         March 2020   F/u after 9 months   Compliant with synthroid         Prior history :    S/p surgery on --  Total thyroidectomy  Dec  5 2017   Started on synthroid 125 mcg post-op  still breast feeding   Hair loss is better per her after surgery          Old history :       She was found to have the nodule in nov 2016   She had biopsy on it which showed papillary thyroid cancer     Pt is accompanied by her .  They brought in a flash drive with medical information  Path was not available on my review    She had biospies on LN , which are enlarged bilaterally, all of them are negative for Thyroglobulin   Suggested surgery in second trimester, but got postponed     She moved from [place] to [place] in July last week   She had her baby in Nov 1 st 2017     She is going in for surgery on [address] by Dr. Salomon Cordova   She is currently BREAST FEEDING         Past Medical History:   Diagnosis Date    Thyroid cancer [facility])        Social History     Socioeconomic History    Marital status:      Spouse name: Not on file    Number of children: Not on file    Years of education: Not on file    Highest education level: Not on file   Occupational History    Not on file   Social Needs    Financial resource strain: Not on file    Food insecurity     Worry: Not on file     Inability: Not on file   [employer] needs     Medical: Not on file     Non-medical: Not on file   Tobacco Use    Smoking status: Current Every Day Smoker     Packs/day: 1.00    Smokeless tobacco: Never Used   Substance and Sexual Activity    Alcohol use: No    Drug use: No    Sexual activity: Not on file   Lifestyle    Physical activity     Days per week: Not on file     Minutes per session: Not on file    Stress: Not on file Relationships    Social connections     Talks on phone: Not on file     Gets together: Not on file     Attends Sabianism service: Not on file     Active member of club or organization: Not on file     Attends meetings of clubs or organizations: Not on file     Relationship status: Not on file    Intimate partner violence     Fear of current or ex partner: Not on file     Emotionally abused: Not on file     Physically abused: Not on file     Forced sexual activity: Not on file   Other Topics Concern    Not on file   Social History Narrative    Not on file       Family History   Problem Relation Age of Onset    Ulcerative Colitis Mother     Diabetes Father     Hypertension Father     Elevated Lipids Father     Hypertension Maternal Grandmother     Cancer Maternal Grandmother     Cancer Maternal Grandfather            Review of Systems   Constitutional: Negative. HENT: Negative. Eyes: Negative for pain and redness. Respiratory: Negative. Cardiovascular: Negative for chest pain, palpitations and leg swelling. Gastrointestinal: Negative. Negative for constipation. Genitourinary: Negative. Musculoskeletal: Negative for myalgias. Skin: Negative. Neurological: Negative. Endo/Heme/Allergies: Negative. Psychiatric/Behavioral: Negative for depression and memory loss. The patient does not have insomnia. Physical Exam   Constitutional: She is oriented to person, place, and time. She appears well-developed and well-nourished. HENT:   Head: Normocephalic. Eyes: Conjunctivae and EOM are normal. Pupils are equal, round, and reactive to light. Neck: Normal range of motion. Neck supple. No JVD present. No tracheal deviation present. S/p surgery no thyromegaly  present. Cardiovascular: Normal rate, regular rhythm and normal heart sounds. No murmur heard. Pulmonary/Chest: Breath sounds normal.   Abdominal: Soft.  Bowel sounds are normal.   Musculoskeletal: Normal range of motion. Lymphadenopathy:     She has no cervical adenopathy. Neurological: She is alert and oriented to person, place, and time. She has normal reflexes. Skin: Skin is warm. Psychiatric: She has a normal mood and affect. Lab Results   Component Value Date/Time    TSH 1.63 02/25/2020 08:17 AM    T4, Free 1.1 02/25/2020 08:17 AM            ASSESSMENT and PLAN    1. Hypothyroidism : post-operative   Started on synthroid 125 mcg on Dec 11 th   Increased the dose to  150 mcg on jan 10th 2018   TSH is 10  - did not change the dose in may 2018   TSH is 20 -  Went up higher    Checked pharmacy refills and she is delaying every month by 10 days to obtain synthroid   In any case, I increased the dose to 175 mcg a day     Feb 2020  :  Euthyroid     sept 2018  :  TSH is 23  -  Noticing non-compliance and educated her to be compliant  increased to 175 mcg a day     Jan 2019  : TSH is suppressed - on 175 mcg a day   Decreased  dose to 150 mcg a day   She changed to working shifts and I advised her to take it between 5- 8 am    TG marker is negative   Negative for recurrence on   usg of neck     May 2019  -   TSH is 0.1  - CONTINUE  MCG A DAY Hao Arevalo EMPHASIZED     Feb 2020  -  Euthyroid on 150 mcg dose of UNITHROID     Sept 2020  -  Ordering labs          2.  Thyroid cancer :   Right side nodule  Of 3.3 cm by 1.8 cm by 1.8 cm, has increased vascularity and increased microcalcifications   Had FNA on nov 22 2016 -  PTC ( could nto review the path from old records)    Had ct neck  Dec 27 2106 and usg reportes reviewed from oct 3 2016 and dec 1 2016   had FNA with TG wash out on bilateral lateral Level III  Lymph nodes - negative for cancer at [facility]     s/p total thyroidectomy  on Dec 5 2017 by Dr. Svetlana Rowe   Reviewed surgical pathology :  Tumor is multifocal ( right lobe 2.5 cm and isthmus 1 mm )  Usual PTC features     Did not  plan for GRAY therapy or ablation because of breast feeding status   Now, she has stopped breast feeding     Given that pt has all  features of  LOW risk for recurrence, will not pursue GRAY ablation therapy and pt is agreeable to it   started on suppressive dose of levothyroxine, but pt had non compliance issues and shift working style     TG <0.1  In  Jan 2019 - but TSH was suppressed to 0.65  usg  Feb 2019 - negative for recurrence      feb 2020  : Will plan for stimulated TSH  And  TG calculation with THYROGEN  Ultrasound  To be done along with  WBS     Sept 2020  : pt and I both, are ready for thyrogen stim test - it is getting done at [facility]  Will do baseline labs and go ahead with it if  TSH is not way suppressed   Pt is given the instructions       2.  Family h/o  Thyroid cancer in maternal grandmother       F/u in 6  months   > 50 % of time is spent on counseling   Patient voiced understanding her plan of care

## 2020-09-05 LAB
ANTI-THYROGLOBULIN ANTIBODIES, 500556: <1 IU/ML
THYROGLOBULIN (ICMA), 500542: <0.1 NG/ML
THYROGLOBULIN (TG-RIA), 500002: NORMAL NG/ML

## 2020-09-08 ENCOUNTER — DOCUMENTATION ONLY (OUTPATIENT)
Dept: ENDOCRINOLOGY | Age: 35
End: 2020-09-08

## 2020-09-16 NOTE — PROGRESS NOTES
As her labs are perfect, lets arrange for Thyrogen stimulated cancer check  - to be arranged at  [facility]

## 2020-09-23 ENCOUNTER — APPOINTMENT (OUTPATIENT)
Dept: CT IMAGING | Age: 35
End: 2020-09-23
Attending: EMERGENCY MEDICINE
Payer: COMMERCIAL

## 2020-09-23 ENCOUNTER — HOSPITAL ENCOUNTER (EMERGENCY)
Age: 35
Discharge: HOME | End: 2020-09-23
Attending: EMERGENCY MEDICINE | Admitting: EMERGENCY MEDICINE
Payer: COMMERCIAL

## 2020-09-23 VITALS
TEMPERATURE: 98.4 F | SYSTOLIC BLOOD PRESSURE: 103 MMHG | BODY MASS INDEX: 27.7 KG/M2 | RESPIRATION RATE: 18 BRPM | DIASTOLIC BLOOD PRESSURE: 79 MMHG | WEIGHT: 166.23 LBS | HEART RATE: 97 BPM | HEIGHT: 65 IN | OXYGEN SATURATION: 96 %

## 2020-09-23 DIAGNOSIS — M54.50 ACUTE RIGHT-SIDED LOW BACK PAIN WITHOUT SCIATICA: Primary | ICD-10-CM

## 2020-09-23 LAB

## 2020-09-23 PROCEDURE — 74011250636 HC RX REV CODE- 250/636: Performed by: EMERGENCY MEDICINE

## 2020-09-23 PROCEDURE — 96372 THER/PROPH/DIAG INJ SC/IM: CPT

## 2020-09-23 PROCEDURE — 74011000250 HC RX REV CODE- 250: Performed by: EMERGENCY MEDICINE

## 2020-09-23 PROCEDURE — 74011250637 HC RX REV CODE- 250/637: Performed by: EMERGENCY MEDICINE

## 2020-09-23 PROCEDURE — 72131 CT LUMBAR SPINE W/O DYE: CPT

## 2020-09-23 PROCEDURE — 81025 URINE PREGNANCY TEST: CPT

## 2020-09-23 PROCEDURE — 74011636637 HC RX REV CODE- 636/637: Performed by: EMERGENCY MEDICINE

## 2020-09-23 PROCEDURE — 99284 EMERGENCY DEPT VISIT MOD MDM: CPT

## 2020-09-23 PROCEDURE — 81001 URINALYSIS AUTO W/SCOPE: CPT

## 2020-09-23 RX ORDER — CYCLOBENZAPRINE HCL 10 MG
10 TABLET ORAL
Status: DISCONTINUED | OUTPATIENT
Start: 2020-09-23 | End: 2020-09-23 | Stop reason: HOSPADM

## 2020-09-23 RX ORDER — LIDOCAINE 50 MG/G
PATCH TOPICAL
Qty: 15 EACH | Refills: 0 | Status: SHIPPED | OUTPATIENT
Start: 2020-09-23 | End: 2021-01-19 | Stop reason: ALTCHOICE

## 2020-09-23 RX ORDER — LIDOCAINE 4 G/100G
1 PATCH TOPICAL EVERY 24 HOURS
Status: DISCONTINUED | OUTPATIENT
Start: 2020-09-23 | End: 2020-09-23 | Stop reason: HOSPADM

## 2020-09-23 RX ORDER — PREDNISONE 20 MG/1
40 TABLET ORAL DAILY
Qty: 8 TAB | Refills: 0 | Status: SHIPPED | OUTPATIENT
Start: 2020-09-24 | End: 2020-09-28

## 2020-09-23 RX ORDER — PREDNISONE 20 MG/1
40 TABLET ORAL
Status: COMPLETED | OUTPATIENT
Start: 2020-09-23 | End: 2020-09-23

## 2020-09-23 RX ORDER — DIAZEPAM 2 MG/1
2 TABLET ORAL
Status: COMPLETED | OUTPATIENT
Start: 2020-09-23 | End: 2020-09-23

## 2020-09-23 RX ORDER — KETOROLAC TROMETHAMINE 30 MG/ML
30 INJECTION, SOLUTION INTRAMUSCULAR; INTRAVENOUS ONCE
Status: COMPLETED | OUTPATIENT
Start: 2020-09-23 | End: 2020-09-23

## 2020-09-23 RX ORDER — DICLOFENAC SODIUM 75 MG/1
75 TABLET, DELAYED RELEASE ORAL 2 TIMES DAILY
Qty: 14 TAB | Refills: 0 | Status: SHIPPED | OUTPATIENT
Start: 2020-09-23 | End: 2020-09-30

## 2020-09-23 RX ORDER — DIAZEPAM 5 MG/1
5 TABLET ORAL
Status: DISCONTINUED | OUTPATIENT
Start: 2020-09-23 | End: 2020-09-23

## 2020-09-23 RX ADMIN — PREDNISONE 40 MG: 20 TABLET ORAL at 13:41

## 2020-09-23 RX ADMIN — DIAZEPAM 2 MG: 2 TABLET ORAL at 13:42

## 2020-09-23 RX ADMIN — KETOROLAC TROMETHAMINE 30 MG: 30 INJECTION, SOLUTION INTRAMUSCULAR at 13:43

## 2020-09-23 NOTE — ED TRIAGE NOTES
Pt reports she slipped and fell in mud 2 days ago and injured her lower back. Pt now reports progressively worsening lower back pain secondary to the GLF. Hx of back surgery and has a eugene and 3 screws. Hx of multiple bulging discs. Ambulatory to triage. Denies bowel or bladder incontinence.

## 2020-09-25 NOTE — ED PROVIDER NOTES
The history is provided by the patient. Back Pain    This is a new problem. The current episode started more than 2 days ago. The problem has not changed since onset. The problem occurs constantly. Patient reports not work related injury. The pain is associated with a fall. The pain is present in the lumbar spine. The quality of the pain is described as similar to previous episodes. The pain radiates to the right thigh. The pain is severe. The symptoms are aggravated by certain positions. The pain is the same all the time. Pertinent negatives include no chest pain, no fever, no abdominal pain, no bowel incontinence, no perianal numbness, no bladder incontinence, no dysuria and no paresthesias. She has tried NSAIDs for the symptoms.  The treatment provided no relief. spine surgery       Past Medical History:   Diagnosis Date    Thyroid cancer ([facility] [address]        Past Surgical History:   Procedure Laterality Date    ENDOCRINE SURGERY PROC UNLISTED      HX BACK SURGERY      HX TUBAL LIGATION           Family History:   Problem Relation Age of Onset    Ulcerative Colitis Mother     Diabetes Father     Hypertension Father     Elevated Lipids Father     Hypertension Maternal Grandmother     Cancer Maternal Grandmother     Cancer Maternal Grandfather        Social History     Socioeconomic History    Marital status:      Spouse name: Not on file    Number of children: Not on file    Years of education: Not on file    Highest education level: Not on file   Occupational History    Not on file   Social Needs    Financial resource strain: Not on file    Food insecurity     Worry: Not on file     Inability: Not on file    Transportation needs     Medical: Not on file     Non-medical: Not on file   Tobacco Use    Smoking status: Current Every Day Smoker     Packs/day: 1.00    Smokeless tobacco: Never Used   Substance and Sexual Activity    Alcohol use: No    Drug use: No    Sexual activity: Not on file Lifestyle    Physical activity     Days per week: Not on file     Minutes per session: Not on file    Stress: Not on file   Relationships    Social connections     Talks on phone: Not on file     Gets together: Not on file     Attends Scientology service: Not on file     Active member of club or organization: Not on file     Attends meetings of clubs or organizations: Not on file     Relationship status: Not on file    Intimate partner violence     Fear of current or ex partner: Not on file     Emotionally abused: Not on file     Physically abused: Not on file     Forced sexual activity: Not on file   Other Topics Concern    Not on file   Social History Narrative    Not on file         ALLERGIES: Latex; Bactrim [sulfamethoprim]; and Morphine    Review of Systems   Constitutional: Negative for chills and fever. Respiratory: Negative for shortness of breath. Cardiovascular: Negative for chest pain. Gastrointestinal: Negative for abdominal pain, bowel incontinence, constipation, diarrhea and vomiting. Genitourinary: Negative for bladder incontinence and dysuria. Musculoskeletal: Positive for back pain and gait problem. Neurological: Negative for dizziness, light-headedness and paresthesias. All other systems reviewed and are negative. Vitals:    09/23/20 1238   BP: 103/79   Pulse: 97   Resp: 18   Temp: 98.4 °F (36.9 °C)   SpO2: 96%   Weight: 75.4 kg (166 lb 3.6 oz)   Height: 5' 5\" (1.651 m)            Physical Exam  Vitals signs and nursing note reviewed. Constitutional:       Appearance: She is well-developed. HENT:      Head: Normocephalic and atraumatic. Eyes:      General: No scleral icterus. Neck:      Musculoskeletal: Normal range of motion. Cardiovascular:      Rate and Rhythm: Normal rate. Pulmonary:      Effort: Pulmonary effort is normal.   Abdominal:      General: There is no distension. Musculoskeletal:      Lumbar back: She exhibits tenderness and spasm.  She exhibits no deformity. Skin:     General: Skin is warm and dry. Findings: No erythema or rash. Neurological:      Mental Status: She is alert and oriented to person, place, and time. MDM       Procedures      The patient presented with acute back pain. The patient is now resting comfortably and feels better, is alert, talkative, interactive and in no distress. The repeat examination is unremarkable and benign. The patient is neurologically intact and is ambulatory in the ED. The patient has no fever, no bowel or bladder incontinence, no saddle anesthesia, and is otherwise alert and well-appearing. The history, physical examination, and diagnostics (if any) do not suggest the presence of acute spinal epidural abscess, acute spinal epidural bleed, cauda equina syndrome, abdominal aortic aneurysm, aortic dissection or other process requiring further testing, treatment or consultation in the emergency department. The vital signs have been stable. The patient's condition is stable and appropriate for discharge. The patient will pursue further outpatient evaluation with the primary care physician or other designated or consulting physician as indicated in the discharge instructions.

## 2020-10-01 ENCOUNTER — TRANSCRIBE ORDER (OUTPATIENT)
Dept: SCHEDULING | Age: 35
End: 2020-10-01

## 2020-10-01 ENCOUNTER — HOSPITAL ENCOUNTER (OUTPATIENT)
Dept: MRI IMAGING | Age: 35
Discharge: HOME | End: 2020-10-01
Attending: ORTHOPAEDIC SURGERY
Payer: COMMERCIAL

## 2020-10-01 DIAGNOSIS — M54.16 LUMBAR RADICULITIS: Primary | ICD-10-CM

## 2020-10-01 DIAGNOSIS — Z98.1 HISTORY OF LUMBAR SPINAL FUSION: ICD-10-CM

## 2020-10-01 DIAGNOSIS — M54.16 LUMBAR RADICULITIS: ICD-10-CM

## 2020-10-01 PROCEDURE — 72148 MRI LUMBAR SPINE W/O DYE: CPT

## 2020-10-13 ENCOUNTER — TELEPHONE (OUTPATIENT)
Dept: ENDOCRINOLOGY | Age: 35
End: 2020-10-13

## 2020-10-13 NOTE — TELEPHONE ENCOUNTER
Spoke with pt this afternoon, informed the importance of having thyrogen first thing in the AM both days, once scheduled at [facility] pt is to call back with dates and times so 3rd day of testing can be ordered.

## 2020-10-15 DIAGNOSIS — C73 THYROID CANCER (HCC): Primary | ICD-10-CM

## 2020-10-20 ENCOUNTER — HOSPITAL ENCOUNTER (OUTPATIENT)
Dept: INFUSION THERAPY | Age: 35
Discharge: HOME | End: 2020-10-20

## 2020-10-21 ENCOUNTER — APPOINTMENT (OUTPATIENT)
Dept: INFUSION THERAPY | Age: 35
End: 2020-10-21

## 2020-10-26 ENCOUNTER — HOSPITAL ENCOUNTER (OUTPATIENT)
Dept: INFUSION THERAPY | Age: 35
Discharge: HOME | End: 2020-10-26
Payer: COMMERCIAL

## 2020-10-26 VITALS
TEMPERATURE: 97.8 F | OXYGEN SATURATION: 99 % | SYSTOLIC BLOOD PRESSURE: 132 MMHG | HEART RATE: 98 BPM | RESPIRATION RATE: 18 BRPM | DIASTOLIC BLOOD PRESSURE: 84 MMHG

## 2020-10-26 PROCEDURE — 74011250636 HC RX REV CODE- 250/636: Performed by: INTERNAL MEDICINE

## 2020-10-26 PROCEDURE — 74011000250 HC RX REV CODE- 250: Performed by: INTERNAL MEDICINE

## 2020-10-26 PROCEDURE — 96372 THER/PROPH/DIAG INJ SC/IM: CPT

## 2020-10-26 RX ADMIN — THYROTROPIN ALFA 0.9 MG: 0.9 INJECTION, POWDER, FOR SOLUTION INTRAMUSCULAR at 11:00

## 2020-10-26 NOTE — PROGRESS NOTES
Amanda Beatty Loul[facility] 39 Visit Note    2037- Pt arrived at [facility] ambulatory and in no distress for Thyrogen (day one). Assessment unremarkable, no new complaints voiced. Visit Vitals  /84 (BP 1 Location: Right arm, BP Patient Position: Sitting)   Pulse 98   Temp 97.8 °F (36.6 °C)   Resp 18   SpO2 99%       Medications received:  Medications Administered     thyrotropin alyx (THYROGEN) 0.9 mg in sterile water (preservative free) 1 mL injection     Admin Date  10/26/2020 Action  Given Dose  0.9 mg Route  IntraMUSCular Administered By  Abbeville Collins              Med injected into right gluteal muscle. 1105-Tolerated treatment well, no adverse reaction noted. D/Cd from [facility] ambulatory and in no distress accompanied by self.   Next appt 10/27

## 2020-10-27 ENCOUNTER — DOCUMENTATION ONLY (OUTPATIENT)
Dept: ENDOCRINOLOGY | Age: 35
End: 2020-10-27

## 2020-10-27 ENCOUNTER — HOSPITAL ENCOUNTER (OUTPATIENT)
Dept: INFUSION THERAPY | Age: 35
Discharge: HOME | End: 2020-10-27
Payer: COMMERCIAL

## 2020-10-27 VITALS
SYSTOLIC BLOOD PRESSURE: 138 MMHG | DIASTOLIC BLOOD PRESSURE: 84 MMHG | RESPIRATION RATE: 18 BRPM | TEMPERATURE: 98 F | HEART RATE: 100 BPM

## 2020-10-27 DIAGNOSIS — C73 THYROID CANCER (HCC): Primary | ICD-10-CM

## 2020-10-27 PROCEDURE — 96372 THER/PROPH/DIAG INJ SC/IM: CPT

## 2020-10-27 PROCEDURE — 74011000250 HC RX REV CODE- 250: Performed by: INTERNAL MEDICINE

## 2020-10-27 PROCEDURE — 74011250636 HC RX REV CODE- 250/636: Performed by: INTERNAL MEDICINE

## 2020-10-27 RX ADMIN — THYROTROPIN ALFA 0.9 MG: 0.9 INJECTION, POWDER, FOR SOLUTION INTRAMUSCULAR at 11:08

## 2020-10-27 NOTE — PROGRESS NOTES
[facility]fusion [facility] Progress Note    1030 Pt admit to [facility] for Day 2 Thyrogen injection ambulatory in stable condition. Assessment completed. No new concerns voiced. Injection given in left Buttock IM    Visit Vitals  /84 (BP 1 Location: Right arm, BP Patient Position: Sitting)   Pulse 100   Temp 98 °F (36.7 °C)   Resp 18   Breastfeeding No       Medications:  Medications Administered     thyrotropin alyx (THYROGEN) 0.9 mg in sterile water (preservative free) 1 mL injection     Admin Date  10/27/2020 Action  Given Dose  0.9 mg Route  IntraMUSCular Administered By  Sarita Garvey, RN                1110 Pt tolerated treatment well. D/c home ambulatory in no distress. Pt aware of next appointment scheduled for NOT Needed.

## 2020-10-28 ENCOUNTER — APPOINTMENT (OUTPATIENT)
Dept: NUCLEAR MEDICINE | Age: 35
End: 2020-10-28
Attending: INTERNAL MEDICINE
Payer: COMMERCIAL

## 2020-10-28 ENCOUNTER — HOSPITAL ENCOUNTER (OUTPATIENT)
Dept: NUCLEAR MEDICINE | Age: 35
Discharge: HOME | End: 2020-10-28
Attending: INTERNAL MEDICINE
Payer: COMMERCIAL

## 2020-10-28 DIAGNOSIS — C73 THYROID CANCER (HCC): ICD-10-CM

## 2020-10-28 PROCEDURE — 78018 THYROID MET IMAGING BODY: CPT

## 2020-10-30 ENCOUNTER — HOSPITAL ENCOUNTER (OUTPATIENT)
Dept: NUCLEAR MEDICINE | Age: 35
Discharge: HOME | End: 2020-10-30
Attending: INTERNAL MEDICINE

## 2020-10-30 NOTE — PROGRESS NOTES
Inform pt that body scan Is negative for any recurrence, but please get the labs drawn for sure today

## 2020-11-04 ENCOUNTER — DOCUMENTATION ONLY (OUTPATIENT)
Dept: ENDOCRINOLOGY | Age: 35
End: 2020-11-04

## 2020-11-04 LAB
THYROGLOB AB SERPL-ACNC: <1 IU/ML
THYROGLOB SERPL-MCNC: <0.1 NG/ML
THYROGLOB SERPL-MCNC: NORMAL NG/ML
TSH SERPL DL<=0.005 MIU/L-ACNC: 14.6 UIU/ML (ref 0.45–4.5)

## 2020-11-05 NOTE — PROGRESS NOTES
I am still not clear if she had labs done on Friday - same day as body scan   I did nto see results       Frederick Pate MD

## 2020-11-05 NOTE — PROGRESS NOTES
The labs are good, but the TSH did not go to over 30 like expected with Thyrogen   Ignore the earlier message sent to you rustam as labs resulted

## 2021-01-19 ENCOUNTER — OFFICE VISIT (OUTPATIENT)
Dept: FAMILY MEDICINE CLINIC | Age: 36
End: 2021-01-19
Payer: COMMERCIAL

## 2021-01-19 VITALS
BODY MASS INDEX: 30.32 KG/M2 | DIASTOLIC BLOOD PRESSURE: 82 MMHG | TEMPERATURE: 98.3 F | HEIGHT: 65 IN | RESPIRATION RATE: 18 BRPM | WEIGHT: 182 LBS | HEART RATE: 103 BPM | OXYGEN SATURATION: 97 % | SYSTOLIC BLOOD PRESSURE: 125 MMHG

## 2021-01-19 DIAGNOSIS — K58.1 IRRITABLE BOWEL SYNDROME WITH CONSTIPATION: Primary | ICD-10-CM

## 2021-01-19 DIAGNOSIS — F41.9 ANXIETY AND DEPRESSION: ICD-10-CM

## 2021-01-19 DIAGNOSIS — F32.A ANXIETY AND DEPRESSION: ICD-10-CM

## 2021-01-19 PROCEDURE — 99202 OFFICE O/P NEW SF 15 MIN: CPT | Performed by: NURSE PRACTITIONER

## 2021-01-19 RX ORDER — CYCLOBENZAPRINE HCL 10 MG
10 TABLET ORAL
COMMUNITY
Start: 2020-10-01 | End: 2021-01-19 | Stop reason: ALTCHOICE

## 2021-01-19 RX ORDER — METHOCARBAMOL 500 MG/1
500 TABLET, FILM COATED ORAL
Qty: 60 TAB | Refills: 1 | Status: SHIPPED | OUTPATIENT
Start: 2021-01-19 | End: 2021-04-14

## 2021-01-19 RX ORDER — DULOXETIN HYDROCHLORIDE 60 MG/1
60 CAPSULE, DELAYED RELEASE ORAL DAILY
Qty: 30 CAP | Refills: 5 | Status: SHIPPED | OUTPATIENT
Start: 2021-01-19 | End: 2021-08-08

## 2021-01-19 NOTE — PROGRESS NOTES
HISTORY OF PRESENT ILLNESS  Abrahan Morales is a 37 y.o. female. HPI  New patient to the practice  She is managed for thyroid dx with Dr. Siri Roy  She is having severe anxiety, has been ongoing for months, worse recently with separation from  and child custody  Has hydroxizine and used to have pristiq but too expensive  Did take cymbalta several years ago  Has pmh of IBS - C and needs to restart meds  Also has chronic back spasms and needs muscle relaxer refill  [facility]    ROS  A comprehensive review of system was obtained and negative except findings in the HPI    Visit Vitals  /82 (BP 1 Location: Right arm, BP Patient Position: Sitting)   Pulse (!) 103   Temp 98.3 °F (36.8 °C) (Oral)   Resp 18   Ht 5' 5\" (1.651 m)   Wt 182 lb (82.6 kg)   LMP 01/05/2021   SpO2 97%   BMI 30.29 kg/m²     Physical Exam  Vitals signs and nursing note reviewed. Constitutional:       Appearance: She is well-developed. Comments:      Neck:      Vascular: No JVD. Cardiovascular:      Rate and Rhythm: Normal rate and regular rhythm. Heart sounds: No murmur. No friction rub. No gallop. Pulmonary:      Effort: Pulmonary effort is normal. No respiratory distress. Breath sounds: Normal breath sounds. No wheezing. Musculoskeletal:      Comments: Right lumbar back spasms on palp; does have lumbar surgical scar   Skin:     General: Skin is warm. Neurological:      Mental Status: She is alert and oriented to person, place, and time. ASSESSMENT and PLAN  Encounter Diagnoses   Name Primary?     Irritable bowel syndrome with constipation Yes    Anxiety and depression      Orders Placed This Encounter    DULoxetine (CYMBALTA) 60 mg capsule    linaCLOtide (Linzess) 145 mcg cap capsule    methocarbamoL (ROBAXIN) 500 mg tablet     Given cymbalta 60mg a day  Cont hydroxizine as well  Start Linzess, reviewed how to take and what to expect  Restart robaxin, cont follow up with ortho    I have discussed the diagnosis with the patient and the intended plan as seen in the above orders. The patient has received an after-visit summary and questions were answered concerning future plans. Patient conveyed understanding of the plan at the time of the visit.     Cee Melendez MSN, ANP  1/19/2021

## 2021-01-19 NOTE — PROGRESS NOTES
Chief Complaint   Patient presents with    [facility] Care    Anxiety    Depression     Pt in office today for establish care  -pt states that she would like to discuss anxiety and depression    1. Have you been to the ER, urgent care clinic since your last visit? Hospitalized since your last visit?no    2. Have you seen or consulted any other health care providers outside of the [address] [place] [address] since your last visit? Include any pap smears or colon screening.  No     Pt has no other concerns

## 2021-01-21 ENCOUNTER — HOSPITAL ENCOUNTER (EMERGENCY)
Age: 36
Discharge: HOME | End: 2021-01-21
Attending: EMERGENCY MEDICINE
Payer: COMMERCIAL

## 2021-01-21 VITALS
OXYGEN SATURATION: 98 % | WEIGHT: 178.57 LBS | DIASTOLIC BLOOD PRESSURE: 95 MMHG | SYSTOLIC BLOOD PRESSURE: 148 MMHG | BODY MASS INDEX: 30.49 KG/M2 | TEMPERATURE: 98.6 F | HEART RATE: 106 BPM | HEIGHT: 64 IN | RESPIRATION RATE: 18 BRPM

## 2021-01-21 DIAGNOSIS — M54.50 ACUTE EXACERBATION OF CHRONIC LOW BACK PAIN: Primary | ICD-10-CM

## 2021-01-21 DIAGNOSIS — G89.29 ACUTE EXACERBATION OF CHRONIC LOW BACK PAIN: Primary | ICD-10-CM

## 2021-01-21 PROCEDURE — 74011250637 HC RX REV CODE- 250/637: Performed by: PHYSICIAN ASSISTANT

## 2021-01-21 PROCEDURE — 74011250636 HC RX REV CODE- 250/636: Performed by: PHYSICIAN ASSISTANT

## 2021-01-21 PROCEDURE — 99283 EMERGENCY DEPT VISIT LOW MDM: CPT

## 2021-01-21 PROCEDURE — 96372 THER/PROPH/DIAG INJ SC/IM: CPT

## 2021-01-21 RX ORDER — DICLOFENAC SODIUM 50 MG/1
50 TABLET, DELAYED RELEASE ORAL 2 TIMES DAILY
Qty: 20 TAB | Refills: 0 | Status: SHIPPED | OUTPATIENT
Start: 2021-01-21 | End: 2021-02-23

## 2021-01-21 RX ORDER — HYDROCODONE BITARTRATE AND ACETAMINOPHEN 7.5; 325 MG/1; MG/1
1 TABLET ORAL
Qty: 10 TAB | Refills: 0 | Status: SHIPPED | OUTPATIENT
Start: 2021-01-21 | End: 2021-01-26

## 2021-01-21 RX ORDER — KETOROLAC TROMETHAMINE 30 MG/ML
60 INJECTION, SOLUTION INTRAMUSCULAR; INTRAVENOUS
Status: COMPLETED | OUTPATIENT
Start: 2021-01-21 | End: 2021-01-21

## 2021-01-21 RX ORDER — HYDROCODONE BITARTRATE AND ACETAMINOPHEN 7.5; 325 MG/1; MG/1
1 TABLET ORAL
Status: COMPLETED | OUTPATIENT
Start: 2021-01-21 | End: 2021-01-21

## 2021-01-21 RX ADMIN — KETOROLAC TROMETHAMINE 60 MG: 30 INJECTION, SOLUTION INTRAMUSCULAR at 12:20

## 2021-01-21 RX ADMIN — HYDROCODONE BITARTRATE AND ACETAMINOPHEN 1 TABLET: 7.5; 325 TABLET ORAL at 12:19

## 2021-01-21 NOTE — ED PROVIDER NOTES
31-year-old female with past medical history significant for chronic low back pain, followed by Dr. Delfina Frank with [facility], presents ambulatory for complaint of low back pain that feels like a flare of her chronic pain. She denies any recent trauma or falling. Last episode of similar pain was 3-4 years ago. Her last THALIA was 3-4 months ago. She has an appointment in 2 weeks with Dr. REYES [facility]. She states muscle relaxers and oral prednisone does not help with her symptoms. She did see her PCP 2 days ago for checkup and was given Robaxin which she endorses taking but still having discomfort. She denies urinary symptoms, bowel or bladder incontinence, urinary retention, saddle anesthesia.            Past Medical History:   Diagnosis Date    Thyroid cancer ([facility] [address]        Past Surgical History:   Procedure Laterality Date    HX BACK SURGERY      HX TUBAL LIGATION      MA ENDOCRINE SURGERY PROC UNLISTED           Family History:   Problem Relation Age of Onset    Ulcerative Colitis Mother     Diabetes Father     Hypertension Father     Elevated Lipids Father     Hypertension Maternal Grandmother     Cancer Maternal Grandmother     Cancer Maternal Grandfather        Social History     Socioeconomic History    Marital status:      Spouse name: Not on file    Number of children: Not on file    Years of education: Not on file    Highest education level: Not on file   Occupational History    Not on file   Social Needs    Financial resource strain: Not on file    Food insecurity     Worry: Not on file     Inability: Not on file    Transportation needs     Medical: Not on file     Non-medical: Not on file   Tobacco Use    Smoking status: Current Every Day Smoker     Packs/day: 1.00    Smokeless tobacco: Never Used   Substance and Sexual Activity    Alcohol use: No    Drug use: No    Sexual activity: Not on file   Lifestyle    Physical activity     Days per week: Not on file     Minutes per session: Not on file    Stress: Not on file   Relationships    Social connections     Talks on phone: Not on file     Gets together: Not on file     Attends Nondenominational service: Not on file     Active member of club or organization: Not on file     Attends meetings of clubs or organizations: Not on file     Relationship status: Not on file    Intimate partner violence     Fear of current or ex partner: Not on file     Emotionally abused: Not on file     Physically abused: Not on file     Forced sexual activity: Not on file   Other Topics Concern    Not on file   Social History Narrative    Not on file         ALLERGIES: Latex, Bactrim [sulfamethoprim], and Morphine    Review of Systems    Vitals:    01/21/21 1146   BP: (!) 148/95   Pulse: (!) 106   Resp: 18   Temp: 98.6 °F (37 °C)   SpO2: 98%   Weight: 81 kg (178 lb 9.2 oz)   Height: 5' 4\" (1.626 m)            Physical Exam  Vitals signs and nursing note reviewed. Constitutional:       General: She is not in acute distress. Appearance: She is well-developed. She is not toxic-appearing or diaphoretic. Comments: WF   HENT:      Head: Normocephalic and atraumatic. Eyes:      General:         Right eye: No discharge. Left eye: No discharge. Conjunctiva/sclera: Conjunctivae normal.      Pupils: Pupils are equal, round, and reactive to light. Neck:      Musculoskeletal: Full passive range of motion without pain and normal range of motion. Trachea: No tracheal tenderness. Cardiovascular:      Rate and Rhythm: Normal rate and regular rhythm. Pulses: Normal pulses. Heart sounds: Normal heart sounds. No murmur. No friction rub. No gallop. Pulmonary:      Effort: Pulmonary effort is normal. No respiratory distress. Breath sounds: Normal breath sounds. No wheezing or rales. Chest:      Chest wall: No tenderness. Abdominal:      General: Bowel sounds are normal. There is no distension. Palpations: Abdomen is soft. Tenderness: There is no abdominal tenderness. There is no guarding or rebound. Musculoskeletal: Normal range of motion. General: No tenderness. Comments: R > L SI joint TTP. Neg SLR. NVI throughout. DP and PT pulses 2+. NVI throughout. No LE edema or calf pain. Gait normal and unassisted. Skin:     General: Skin is warm and dry. Capillary Refill: Capillary refill takes less than 2 seconds. Findings: No abrasion, erythema or rash. Neurological:      Mental Status: She is alert and oriented to person, place, and time. Cranial Nerves: No cranial nerve deficit. Sensory: No sensory deficit. Coordination: Coordination normal.   Psychiatric:         Speech: Speech normal.         Behavior: Behavior normal.          MDM  Number of Diagnoses or Management Options  Acute exacerbation of chronic low back pain  Diagnosis management comments:   Ddx: back strain, acute on chronic pain       Amount and/or Complexity of Data Reviewed  Review and summarize past medical records: yes    Patient Progress  Patient progress: stable         Procedures        MEDICATIONS GIVEN:  Medications   ketorolac (TORADOL) injection 60 mg (60 mg IntraMUSCular Given 1/21/21 1220)   HYDROcodone-acetaminophen (NORCO) 7.5-325 mg per tablet 1 Tab (1 Tab Oral Given 1/21/21 1219)         DISCHARGE NOTE:  1:13 PM  The patient's results have been reviewed with them and/or available family. Patient and/or family verbally conveyed their understanding and agreement of the patient's signs, symptoms, diagnosis, treatment and prognosis and additionally agree to follow up as recommended in the discharge instructions or to return to the Emergency Room should their condition change prior to their follow-up appointment. The patient/family verbally agrees with the care-plan and verbally conveys that all of their questions have been answered.  The discharge instructions have also been provided to the patient and/or family with some educational information regarding the patient's diagnosis as well a list of reasons why the patient would want to return to the ER prior to their follow-up appointment, should their condition change. Plan:  1. F/U with rubi Sheriff  2. Rx norco, diclofenac; continue prescribed Robaxin  3.  Return precautions discussed and advised to return to ER if worse

## 2021-01-21 NOTE — ED TRIAGE NOTES
Patient presents with 3-day history of lower back pain (right > left) that flared up but isn't associated with recent lifting/moving or other injury.

## 2021-02-18 ENCOUNTER — OFFICE VISIT (OUTPATIENT)
Dept: FAMILY MEDICINE CLINIC | Age: 36
End: 2021-02-18
Payer: COMMERCIAL

## 2021-02-18 VITALS
BODY MASS INDEX: 32.27 KG/M2 | HEIGHT: 64 IN | SYSTOLIC BLOOD PRESSURE: 128 MMHG | OXYGEN SATURATION: 97 % | WEIGHT: 189 LBS | RESPIRATION RATE: 18 BRPM | TEMPERATURE: 98.6 F | HEART RATE: 118 BPM | DIASTOLIC BLOOD PRESSURE: 66 MMHG

## 2021-02-18 DIAGNOSIS — M51.36 DDD (DEGENERATIVE DISC DISEASE), LUMBAR: ICD-10-CM

## 2021-02-18 DIAGNOSIS — Z00.00 WELL ADULT EXAM: Primary | ICD-10-CM

## 2021-02-18 LAB
ALBUMIN SERPL-MCNC: 3.8 G/DL (ref 3.5–5)
ALBUMIN/GLOB SERPL: 1.1 {RATIO} (ref 1.1–2.2)
ALP SERPL-CCNC: 60 U/L (ref 45–117)
ALT SERPL-CCNC: 33 U/L (ref 12–78)
ANION GAP SERPL CALC-SCNC: 8 MMOL/L (ref 5–15)
AST SERPL-CCNC: 14 U/L (ref 15–37)
BASOPHILS # BLD: 0.1 K/UL (ref 0–0.1)
BASOPHILS NFR BLD: 1 % (ref 0–1)
BILIRUB SERPL-MCNC: 0.2 MG/DL (ref 0.2–1)
BUN SERPL-MCNC: 9 MG/DL (ref 6–20)
BUN/CREAT SERPL: 11 (ref 12–20)
CALCIUM SERPL-MCNC: 8.8 MG/DL (ref 8.5–10.1)
CHLORIDE SERPL-SCNC: 109 MMOL/L (ref 97–108)
CHOLEST SERPL-MCNC: 299 MG/DL
CO2 SERPL-SCNC: 21 MMOL/L (ref 21–32)
CREAT SERPL-MCNC: 0.84 MG/DL (ref 0.55–1.02)
DIFFERENTIAL METHOD BLD: ABNORMAL
EOSINOPHIL # BLD: 0.3 K/UL (ref 0–0.4)
EOSINOPHIL NFR BLD: 2 % (ref 0–7)
ERYTHROCYTE [DISTWIDTH] IN BLOOD BY AUTOMATED COUNT: 13.9 % (ref 11.5–14.5)
GLOBULIN SER CALC-MCNC: 3.6 G/DL (ref 2–4)
GLUCOSE SERPL-MCNC: 169 MG/DL (ref 65–100)
HCT VFR BLD AUTO: 43 % (ref 35–47)
HDLC SERPL-MCNC: 41 MG/DL
HDLC SERPL: 7.3 {RATIO} (ref 0–5)
HGB BLD-MCNC: 14 G/DL (ref 11.5–16)
IMM GRANULOCYTES # BLD AUTO: 0.1 K/UL (ref 0–0.04)
IMM GRANULOCYTES NFR BLD AUTO: 1 % (ref 0–0.5)
LDLC SERPL CALC-MCNC: 206 MG/DL (ref 0–100)
LIPID PROFILE,FLP: ABNORMAL
LYMPHOCYTES # BLD: 3.1 K/UL (ref 0.8–3.5)
LYMPHOCYTES NFR BLD: 26 % (ref 12–49)
MCH RBC QN AUTO: 29.4 PG (ref 26–34)
MCHC RBC AUTO-ENTMCNC: 32.6 G/DL (ref 30–36.5)
MCV RBC AUTO: 90.1 FL (ref 80–99)
MONOCYTES # BLD: 0.6 K/UL (ref 0–1)
MONOCYTES NFR BLD: 5 % (ref 5–13)
NEUTS SEG # BLD: 7.7 K/UL (ref 1.8–8)
NEUTS SEG NFR BLD: 65 % (ref 32–75)
NRBC # BLD: 0 K/UL (ref 0–0.01)
NRBC BLD-RTO: 0 PER 100 WBC
PLATELET # BLD AUTO: 273 K/UL (ref 150–400)
PMV BLD AUTO: 9.7 FL (ref 8.9–12.9)
POTASSIUM SERPL-SCNC: 4 MMOL/L (ref 3.5–5.1)
PROT SERPL-MCNC: 7.4 G/DL (ref 6.4–8.2)
RBC # BLD AUTO: 4.77 M/UL (ref 3.8–5.2)
SODIUM SERPL-SCNC: 138 MMOL/L (ref 136–145)
TRIGL SERPL-MCNC: 260 MG/DL (ref ?–150)
VLDLC SERPL CALC-MCNC: 52 MG/DL
WBC # BLD AUTO: 11.8 K/UL (ref 3.6–11)

## 2021-02-18 PROCEDURE — 99395 PREV VISIT EST AGE 18-39: CPT | Performed by: NURSE PRACTITIONER

## 2021-02-18 RX ORDER — HYDROXYZINE PAMOATE 50 MG/1
CAPSULE ORAL
Qty: 60 CAP | Refills: 2 | Status: SHIPPED | OUTPATIENT
Start: 2021-02-18 | End: 2021-08-01

## 2021-02-18 RX ORDER — TRAMADOL HYDROCHLORIDE 50 MG/1
50 TABLET ORAL
Qty: 30 TAB | Refills: 0 | Status: SHIPPED | OUTPATIENT
Start: 2021-02-18 | End: 2021-02-23

## 2021-02-18 NOTE — PROGRESS NOTES
Subjective:   37 y.o. female for Well Woman Check. Her gyne and breast care is done elsewhere by her Ob-Gyne physician. Patient Active Problem List    Diagnosis Date Noted    Thyroid cancer ([facility] [address] 06/19/2018    Postoperative hypothyroidism 01/08/2018     Current Outpatient Medications   Medication Sig Dispense Refill    hydrOXYzine pamoate (VISTARIL) 50 mg capsule take 1 capsule by mouth every 8 hours if needed for anxiety 60 Cap 2    traMADoL (ULTRAM) 50 mg tablet Take 1 Tab by mouth two (2) times daily as needed for Pain for up to 30 days. Max Daily Amount: 100 mg. 30 Tab 0    diclofenac EC (VOLTAREN) 50 mg EC tablet Take 1 Tab by mouth two (2) times a day. 20 Tab 0    DULoxetine (CYMBALTA) 60 mg capsule Take 1 Cap by mouth daily. 30 Cap 5    linaCLOtide (Linzess) 145 mcg cap capsule Take 1 Cap by mouth Daily (before breakfast). 30 Cap 5    methocarbamoL (ROBAXIN) 500 mg tablet Take 1 Tab by mouth three (3) times daily as needed for Muscle Spasm(s). 60 Tab 1    levothyroxine (UNITHROID) 150 mcg tablet Take 1 Tab by mouth Daily (before breakfast). BMN 80 Tab 3     Family History   Problem Relation Age of Onset    Ulcerative Colitis Mother     Diabetes Father     Hypertension Father     Elevated Lipids Father     Hypertension Maternal Grandmother     Cancer Maternal Grandmother     Cancer Maternal Grandfather      Social History     Tobacco Use    Smoking status: Current Every Day Smoker     Packs/day: 1.00    Smokeless tobacco: Never Used   Substance Use Topics    Alcohol use: No           ROS: Feeling generally well. No TIA's or unusual headaches, no dysphagia. No prolonged cough. No dyspnea or chest pain on exertion. No abdominal pain, change in bowel habits, black or bloody stools. No urinary tract symptoms. No new or unusual musculoskeletal symptoms.     Specific concerns today: having back surgery with Dr. Arlette Bar in 2 weeks; would like med for pain, he wont write until after surgery. Objective: The patient appears well, alert, oriented x 3, in no distress. Visit Vitals  /66 (BP 1 Location: Left upper arm, BP Patient Position: Sitting)   Pulse (!) 118   Temp 98.6 °F (37 °C) (Oral)   Resp 18   Ht 5' 4\" (1.626 m)   Wt 189 lb (85.7 kg)   LMP 01/18/2021   SpO2 97%   BMI 32.44 kg/m²     ENT normal.  Neck supple. No adenopathy or thyromegaly. KEN. Lungs are clear, good air entry, no wheezes, rhonchi or rales. S1 and S2 normal, no murmurs, regular rate and rhythm. Abdomen soft without tenderness, guarding, mass or organomegaly. Extremities show no edema, normal peripheral pulses. Neurological is normal, no focal findings. Breast and Pelvic exams are deferred. Assessment/Plan:   Well Woman  lose weight, increase physical activity, follow low fat diet, follow low salt diet, routine labs ordered  Encounter Diagnoses   Name Primary?  Well adult exam Yes    DDD (degenerative disc disease), lumbar      Orders Placed This Encounter    LIPID PANEL    CBC WITH AUTOMATED DIFF    METABOLIC PANEL, COMPREHENSIVE    hydrOXYzine pamoate (VISTARIL) 50 mg capsule    traMADoL (ULTRAM) 50 mg tablet     Labs updated  Given small number of tramadol for pain  Follow up pending lab results    I have discussed the diagnosis with the patient and the intended plan as seen in the above orders. The patient has received an after-visit summary and questions were answered concerning future plans. Patient conveyed understanding of the plan at the time of the visit.     Codi Barrett, MSN, ANP  2/18/2021

## 2021-02-18 NOTE — PROGRESS NOTES
Chief Complaint   Patient presents with    Medication Refill    Labs     Pt in office today for med refill  -pt wants to see about increasing the cymbalta  -labs      1. Have you been to the ER, urgent care clinic since your last visit? Hospitalized since your last visit? No    2. Have you seen or consulted any other health care providers outside of the [address] [place] [address] since your last visit? Include any pap smears or colon screening.  No     Pt has no other concerns

## 2021-02-22 NOTE — PROGRESS NOTES
Your cholesterol is critically high. You really need to be on a med to get this down. It is genetic since you are so young and it is so high. If agree, I will send in Crestor 10mg to take once a day.  I would want to recheck this again in 3 months fasting. Radha Fields

## 2021-02-23 ENCOUNTER — HOSPITAL ENCOUNTER (OUTPATIENT)
Dept: PREADMISSION TESTING | Age: 36
Discharge: HOME | End: 2021-02-23
Payer: COMMERCIAL

## 2021-02-23 VITALS
DIASTOLIC BLOOD PRESSURE: 76 MMHG | HEIGHT: 64 IN | SYSTOLIC BLOOD PRESSURE: 121 MMHG | BODY MASS INDEX: 32.48 KG/M2 | RESPIRATION RATE: 16 BRPM | OXYGEN SATURATION: 96 % | HEART RATE: 116 BPM | TEMPERATURE: 97.7 F | WEIGHT: 190.26 LBS

## 2021-02-23 LAB
25(OH)D3 SERPL-MCNC: 13.3 NG/ML (ref 30–100)
ABO + RH BLD: NORMAL
ALBUMIN SERPL-MCNC: 3.8 G/DL (ref 3.5–5)
ALBUMIN/GLOB SERPL: 1.1 {RATIO} (ref 1.1–2.2)
ALP SERPL-CCNC: 66 U/L (ref 45–117)
ALT SERPL-CCNC: 38 U/L (ref 12–78)
ANION GAP SERPL CALC-SCNC: 5 MMOL/L (ref 5–15)
APPEARANCE UR: ABNORMAL
APTT PPP: 27.1 SEC (ref 22.1–31)
AST SERPL-CCNC: 19 U/L (ref 15–37)
ATRIAL RATE: 101 BPM
BACTERIA URNS QL MICRO: NEGATIVE /HPF
BASOPHILS # BLD: 0.1 K/UL (ref 0–0.1)
BASOPHILS NFR BLD: 1 % (ref 0–1)
BILIRUB SERPL-MCNC: 0.2 MG/DL (ref 0.2–1)
BILIRUB UR QL CFM: NEGATIVE
BLOOD GROUP ANTIBODIES SERPL: NORMAL
BUN SERPL-MCNC: 9 MG/DL (ref 6–20)
BUN/CREAT SERPL: 12 (ref 12–20)
CALCIUM SERPL-MCNC: 8.5 MG/DL (ref 8.5–10.1)
CALCULATED P AXIS, ECG09: 68 DEGREES
CALCULATED R AXIS, ECG10: 53 DEGREES
CALCULATED T AXIS, ECG11: 44 DEGREES
CHLORIDE SERPL-SCNC: 105 MMOL/L (ref 97–108)
CO2 SERPL-SCNC: 27 MMOL/L (ref 21–32)
COLOR UR: ABNORMAL
CREAT SERPL-MCNC: 0.75 MG/DL (ref 0.55–1.02)
DIAGNOSIS, 93000: NORMAL
DIFFERENTIAL METHOD BLD: ABNORMAL
EOSINOPHIL # BLD: 0.3 K/UL (ref 0–0.4)
EOSINOPHIL NFR BLD: 2 % (ref 0–7)
EPITH CASTS URNS QL MICRO: ABNORMAL /LPF
ERYTHROCYTE [DISTWIDTH] IN BLOOD BY AUTOMATED COUNT: 13.5 % (ref 11.5–14.5)
GLOBULIN SER CALC-MCNC: 3.4 G/DL (ref 2–4)
GLUCOSE SERPL-MCNC: 94 MG/DL (ref 65–100)
GLUCOSE UR STRIP.AUTO-MCNC: NEGATIVE MG/DL
HCT VFR BLD AUTO: 42.5 % (ref 35–47)
HGB BLD-MCNC: 14.1 G/DL (ref 11.5–16)
HGB UR QL STRIP: NEGATIVE
HYALINE CASTS URNS QL MICRO: ABNORMAL /LPF (ref 0–5)
IMM GRANULOCYTES # BLD AUTO: 0.1 K/UL (ref 0–0.04)
IMM GRANULOCYTES NFR BLD AUTO: 1 % (ref 0–0.5)
INR PPP: 1 (ref 0.9–1.1)
KETONES UR QL STRIP.AUTO: ABNORMAL MG/DL
LEUKOCYTE ESTERASE UR QL STRIP.AUTO: NEGATIVE
LYMPHOCYTES # BLD: 3 K/UL (ref 0.8–3.5)
LYMPHOCYTES NFR BLD: 26 % (ref 12–49)
MCH RBC QN AUTO: 29.8 PG (ref 26–34)
MCHC RBC AUTO-ENTMCNC: 33.2 G/DL (ref 30–36.5)
MCV RBC AUTO: 89.9 FL (ref 80–99)
MONOCYTES # BLD: 0.6 K/UL (ref 0–1)
MONOCYTES NFR BLD: 5 % (ref 5–13)
NEUTS SEG # BLD: 7.4 K/UL (ref 1.8–8)
NEUTS SEG NFR BLD: 65 % (ref 32–75)
NITRITE UR QL STRIP.AUTO: NEGATIVE
NRBC # BLD: 0 K/UL (ref 0–0.01)
NRBC BLD-RTO: 0 PER 100 WBC
P-R INTERVAL, ECG05: 136 MS
PH UR STRIP: 5.5 [PH] (ref 5–8)
PLATELET # BLD AUTO: 298 K/UL (ref 150–400)
PMV BLD AUTO: 8.9 FL (ref 8.9–12.9)
POTASSIUM SERPL-SCNC: 4.4 MMOL/L (ref 3.5–5.1)
PROT SERPL-MCNC: 7.2 G/DL (ref 6.4–8.2)
PROT UR STRIP-MCNC: NEGATIVE MG/DL
PROTHROMBIN TIME: 10.3 SEC (ref 9–11.1)
Q-T INTERVAL, ECG07: 358 MS
QRS DURATION, ECG06: 80 MS
QTC CALCULATION (BEZET), ECG08: 464 MS
RBC # BLD AUTO: 4.73 M/UL (ref 3.8–5.2)
RBC #/AREA URNS HPF: ABNORMAL /HPF (ref 0–5)
SODIUM SERPL-SCNC: 137 MMOL/L (ref 136–145)
SP GR UR REFRACTOMETRY: 1.03 (ref 1–1.03)
SPECIMEN EXP DATE BLD: NORMAL
THERAPEUTIC RANGE,PTTT: NORMAL SECS (ref 58–77)
UA: UC IF INDICATED,UAUC: ABNORMAL
UROBILINOGEN UR QL STRIP.AUTO: 1 EU/DL (ref 0.2–1)
VENTRICULAR RATE, ECG03: 101 BPM
WBC # BLD AUTO: 11.4 K/UL (ref 3.6–11)
WBC URNS QL MICRO: ABNORMAL /HPF (ref 0–4)

## 2021-02-23 PROCEDURE — 36415 COLL VENOUS BLD VENIPUNCTURE: CPT

## 2021-02-23 PROCEDURE — 86901 BLOOD TYPING SEROLOGIC RH(D): CPT

## 2021-02-23 PROCEDURE — 93005 ELECTROCARDIOGRAM TRACING: CPT

## 2021-02-23 PROCEDURE — 80053 COMPREHEN METABOLIC PANEL: CPT

## 2021-02-23 PROCEDURE — 85025 COMPLETE CBC W/AUTO DIFF WBC: CPT

## 2021-02-23 PROCEDURE — 82306 VITAMIN D 25 HYDROXY: CPT

## 2021-02-23 PROCEDURE — 83036 HEMOGLOBIN GLYCOSYLATED A1C: CPT

## 2021-02-23 PROCEDURE — 81001 URINALYSIS AUTO W/SCOPE: CPT

## 2021-02-23 PROCEDURE — 85730 THROMBOPLASTIN TIME PARTIAL: CPT

## 2021-02-23 PROCEDURE — 85610 PROTHROMBIN TIME: CPT

## 2021-02-23 RX ORDER — DIPHENHYDRAMINE HCL 25 MG
25 TABLET ORAL EVERY EVENING
COMMUNITY

## 2021-02-23 NOTE — PERIOP NOTES
[address] , [address] [facility]   MAIN OR                                  [phone]   MAIN PRE OP                          [phone]                                                                                AMBULATORY PRE OP          [phone]  PRE-ADMISSION TESTING    [phone]   Surgery Date: Monday 3/1/21        Is surgery arrival time given by surgeon? YES  NO  If NO, [address] [facility] staff will call you between 3 and 7pm the day before your surgery with your arrival time. (If your surgery is on a Monday, we will call you the Friday before.)    Call [phone] after 7pm Monday-Friday if you did not receive this call. INSTRUCTIONS BEFORE YOUR SURGERY   When You  Arrive Arrive at the [address] [facility] on the day of your surgery  Have your insurance card, photo ID, and any copayment (if needed)   Food   and   Drink NO food or drink after midnight the night before surgery    This means NO water, gum, mints, coffee, juice, etc.  No alcohol (beer, wine, liquor) 24 hours before and after surgery   Medications to   TAKE   Morning of Surgery MEDICATIONS TO TAKE THE MORNING OF SURGERY WITH A SIP OF WATER:    Synthroid   methocarbamal   duloxetine   hydroxyzine   Medications  To  STOP      7 days before surgery  Non-Steroidal anti-inflammatory Drugs (NSAID's): for example, Ibuprofen (Advil, Motrin), Naproxen (Aleve)   Aspirin   Herbal supplements, vitamins, and fish oil   Other:  (Pain medications not listed above, including Tylenol may be taken)   Blood  Thinners  If you take  Aspirin, Plavix, Coumadin, or any blood-thinning or anti-blood clot medicine, talk to the doctor who prescribed the medications for pre-operative instructions.    Bathing Clothing  Jewelry  Valuables      If you shower the morning of surgery, please do not apply anything to your skin (lotions, powders, deodorant, or makeup, especially goyo)   Follow Chlorhexidine Care Fusion body wash instructions provided to you during PAT appointment. Begin 3 days prior to surgery.  Do not shave or trim anywhere 24 hours before surgery   Wear your hair loose or down; no pony-tails, buns, or metal hair clips   Wear loose, comfortable, clean clothes   Wear glasses instead of contacts   Leave money, valuables, and jewelry, including body piercings, at home   Going Home - or Spending the Night  SAME-DAY SURGERY: You must have a responsible adult drive you home and stay with you 24 hours after surgery   ADMITS: If your doctor is keeping you in the hospital after surgery, leave personal belongings/luggage in your car until you have a hospital room number. Hospital discharge time is 12 noon  Drivers must be here before 12 noon unless you are told differently   Special Instructions It is now mandated that all surgical patients be tested for COVID-19 prior to surgery. Testing has to be exactly 4 days prior to surgery. Your COVID test date is Thursday 2/25/21 between 8:00 am and 11:00 am.       COVID testing will be performed curbside at the [address] [place] [address] Dr ruelas. There will be signs leading you to the testing site. You will need to bring a photo ID with you to be swabbed. Patients are advised to self-quarantine at home after testing and prior to your surgery date. You will be notified if your results are positive.     What to watch for:   Coronavirus (COVID-19) affects different people in different ways   It also appears with a wide range of symptoms from mild to severe   Signs usually appear 2-14 days after exposure     If you develop any of the following, notify your doctor immediately:  o Fever  o Chills, with or without a shiver  o Muscle pain  o Headache  o Sore throat  o Dry cough  o New loss of taste or smell  o Tiredness      If you develop any of the following, call 911:  o Shortness of breath  o Difficulty breathing  o Chest pain  o New confusion  o Blueness of fingers and/or lips     Follow all instructions so your surgery wont be cancelled. Please, be on time. If a situation occurs and you are delayed the day of surgery, call [phone]     If your physical condition changes (like a fever, cold, flu, etc.) call your surgeon. Home medication(s) reviewed and verified via   LIST   VERBAL   during PAT appointment. The patient was contacted    IN-PERSON  The patient verbalizes understanding of all instructions and   DOES NOT   need reinforcement.

## 2021-02-23 NOTE — H&P
Preoperative Evaluation                     History and Physical with Surgical Risk Stratification     2/23/2021    CC: Low back pain  Surgery: L4-5 Laminectomy Revision     HPI:   Francesco Kohler is a 37 y.o. female referred for pre-operative evaluation by Dr. Aric Alonso for surgery on 3/1/21. Ms. Alexandria Zheng states she had a low back surgery in 2008 and did well until October of 2020 when she had a fall onto her back. She notes the pain started immediately. She also notes that neurosurgery has told her that she has a cyst on her spine. She came today in a wheelchair and seems very uncomfortable. She notes that walking long distances makes her leg shake. She has pain mostly on the right but mild in the left. She has leg numbness. Doing activities will increase her pain. She works at [employer] and does a lot of walking, twisting, lifting and standing. She was using a heating pad but burned herself so stopped. The patient was evaluated in the surgeon's office and it was determined that the most appropriate plan of care is to proceed with surgical intervention. Patient's PCP Jack Downey NP    Review of Systems     Constitutional: Negative for chills and fever  HENT: Negative for congestion and sore throat  Eyes: negative for blurred vision and double vision  Respiratory: Negative for cough, shortness of breath and wheezing  Mouth: Poor dentition   Cardiovascular: Negative for chest pain and palpitations  Gastrointestinal: Negative for abdominal pain, nausea, diarrhea. Constipation  Genitourinary: Negative for dysuria and hematuria  Musculoskeletal: Low back pain  Skin: Negative for rash, open wounds. Neurological: Negative for dizziness, tremors and headaches  Psychiatric: The patient is not nervous/anxious.     Inherent Risk of Surgery     Surgical risk:  Intermediate  Low:  EIntermediate:   Spinal surgery  Patient Cardiac Risk Assessment     Revised Cardiac Risk Index (RCRI)    Rate if cardiac death, nonfatal MI, nonfatal cardiac arrest by number of risk factor- 0.4%    SLADE/AHA 2007 Guidelines:   1) Surgery Emergency, Non-cardiac -> to surgery  2) If not, look at clinical predictors    Intermediate Minor     Blood Thinner: NA    METS      EQUAL TO 4 Care for self Walk indoors around house Walk 2-3 blocks on level ground (2-3 mph) Light work around house (dust, dishes)     Other Risk Factors:   Screening for ETOH use:  Done and low risk  Smoking status:  Currently     Personal or FH of bleeding problems:  No  Personal or FH of blood clots:  No  Personal or FH of anesthesia problems:   No    Pulmonary Risk:  Asthma or COPD:  No  Body mass index is 32.66 kg/m². Known BOGDAN:  No    Past Medical, Surgical, Social History     Allergies: Allergies   Allergen Reactions    Latex Hives    Bactrim [Sulfamethoprim] Other (comments)     unresponsive      Morphine Nausea and Vomiting       Medication Documentation Review Audit     Reviewed by Ced Olivera RN (Registered Nurse) on 02/23/21 at 1325    Medication Sig Documenting Provider Last Dose Status Taking? diphenhydrAMINE (Benadryl Allergy) 25 mg tablet Take 25 mg by mouth every evening. Provider, Historical  Active Yes   DULoxetine (CYMBALTA) 60 mg capsule Take 1 Cap by mouth daily. Ashley Lubin NP  Active Yes   fexofenadine HCl (ALLEGRA PO) Take 1 Tab by mouth as needed. Provider, Historical  Active Yes   hydrOXYzine pamoate (VISTARIL) 50 mg capsule take 1 capsule by mouth every 8 hours if needed for anxiety Ashley Lubin NP  Active Yes   levothyroxine (UNITHROID) 150 mcg tablet Take 1 Tab by mouth Daily (before breakfast). Reinaldo Gomez MD  Active Yes   linaCLOtide (Linzess) 145 mcg cap capsule Take 1 Cap by mouth Daily (before breakfast). Ashley Lubin NP  Active Yes   methocarbamoL (ROBAXIN) 500 mg tablet Take 1 Tab by mouth three (3) times daily as needed for Muscle Spasm(s).  Ashley Lubin NP  Active Yes   oxyCODONE 7.5 mg TbOr Take 7.5 mg by mouth two (2) times a day and at bedtime. Provider, Historical  Active Yes              Past Medical History:   Diagnosis Date    Anxiety and depression     IBS (irritable bowel syndrome)     Poor dentition     Thyroid cancer (HCC)      Past Surgical History:   Procedure Laterality Date    HX LUMBAR LAMINECTOMY  09/2008    HX THYROIDECTOMY      HX TUBAL LIGATION       Social History     Tobacco Use    Smoking status: Current Every Day Smoker     Packs/day: 1.00    Smokeless tobacco: Never Used   Substance Use Topics    Alcohol use: No    Drug use: No     Family History   Problem Relation Age of Onset    Ulcerative Colitis Mother     Diabetes Father     Hypertension Father     Elevated Lipids Father     Hypertension Maternal Grandmother     Cancer Maternal Grandmother     Cancer Maternal Grandfather     Anesth Problems Neg Hx     Clotting Disorder Neg Hx        Objective     Vitals:    02/23/21 1308 02/23/21 1402   BP: (!) 128/102 121/76   Pulse: (!) 132 (!) 116   Resp: 16    Temp: 97.7 °F (36.5 °C)    SpO2: 96%    Weight: 86.3 kg (190 lb 4.1 oz)    Height: 5' 4\" (1.626 m)        Constitutional:  Appears well,  No Acute Distress, Vitals noted  Psychiatric:   Affect normal, Alert and Oriented to person/place/time    Eyes:   Pupils equally round and reactive, EOMI, conjunctiva clear, eyelids normal  ENT:   External ears and nose normal, teeth abnormal, gums abnormal, TMs and Orophyarynx normal  Neck:   General inspection and Thyroid normal.  No abnormal cervical or supraclavicular nodes    Lungs:   Clear to auscultation, good respiratory effort  Heart: Ausculation normal.  Regular rhythm. Tachycardia. No cardiac murmurs.   No carotid bruits or palpable thrills  Chest wall normal  Musculoskeletal: Gait antalgic  Extremities:   Without edema, good peripheral pulses  Skin:   Warm to palpation, without rashes, bruising, or suspicious lesions     Recent Results (from the past 72 hour(s))   CBC WITH AUTOMATED DIFF    Collection Time: 02/23/21  2:03 PM   Result Value Ref Range    WBC 11.4 (H) 3.6 - 11.0 K/uL    RBC 4.73 3.80 - 5.20 M/uL    HGB 14.1 11.5 - 16.0 g/dL    HCT 42.5 35.0 - 47.0 %    MCV 89.9 80.0 - 99.0 FL    MCH 29.8 26.0 - 34.0 PG    MCHC 33.2 30.0 - 36.5 g/dL    RDW 13.5 11.5 - 14.5 %    PLATELET 523 775 - 604 K/uL    MPV 8.9 8.9 - 12.9 FL    NRBC 0.0 0  WBC    ABSOLUTE NRBC 0.00 0.00 - 0.01 K/uL    NEUTROPHILS 65 32 - 75 %    LYMPHOCYTES 26 12 - 49 %    MONOCYTES 5 5 - 13 %    EOSINOPHILS 2 0 - 7 %    BASOPHILS 1 0 - 1 %    IMMATURE GRANULOCYTES 1 (H) 0.0 - 0.5 %    ABS. NEUTROPHILS 7.4 1.8 - 8.0 K/UL    ABS. LYMPHOCYTES 3.0 0.8 - 3.5 K/UL    ABS. MONOCYTES 0.6 0.0 - 1.0 K/UL    ABS. EOSINOPHILS 0.3 0.0 - 0.4 K/UL    ABS. BASOPHILS 0.1 0.0 - 0.1 K/UL    ABS. IMM.  GRANS. 0.1 (H) 0.00 - 0.04 K/UL    DF AUTOMATED     PROTHROMBIN TIME + INR    Collection Time: 02/23/21  2:03 PM   Result Value Ref Range    INR 1.0 0.9 - 1.1      Prothrombin time 10.3 9.0 - 11.1 sec   PTT    Collection Time: 02/23/21  2:03 PM   Result Value Ref Range    aPTT 27.1 22.1 - 31.0 sec    aPTT, therapeutic range     58.0 - 77.0 SECS   URINALYSIS W/ REFLEX CULTURE    Collection Time: 02/23/21  2:03 PM    Specimen: Urine    Urine specimen   Result Value Ref Range    Color YELLOW/STRAW      Appearance CLOUDY (A) CLEAR      Specific gravity 1.027 1.003 - 1.030      pH (UA) 5.5 5.0 - 8.0      Protein Negative NEG mg/dL    Glucose Negative NEG mg/dL    Ketone TRACE (A) NEG mg/dL    Blood Negative NEG      Urobilinogen 1.0 0.2 - 1.0 EU/dL    Nitrites Negative NEG      Leukocyte Esterase Negative NEG      WBC 0-4 0 - 4 /hpf    RBC 0-5 0 - 5 /hpf    Epithelial cells MODERATE (A) FEW /lpf    Bacteria Negative NEG /hpf    UA:UC IF INDICATED CULTURE NOT INDICATED BY UA RESULT CNI      Hyaline cast 0-2 0 - 5 /lpf   BILIRUBIN, CONFIRM    Collection Time: 02/23/21  2:03 PM   Result Value Ref Range    Bilirubin UA, confirm Negative NEG     EKG, 12 LEAD, INITIAL    Collection Time: 02/23/21  2:23 PM   Result Value Ref Range    Ventricular Rate 103 BPM    Atrial Rate 103 BPM    P-R Interval 140 ms    QRS Duration 80 ms    Q-T Interval 360 ms    QTC Calculation (Bezet) 471 ms    Calculated P Axis 66 degrees    Calculated R Axis 52 degrees    Calculated T Axis 63 degrees    Diagnosis       Sinus tachycardia  Otherwise normal ECG  No previous ECGs available         Assessment and Plan     Assessment/Plan:   1) Lumbar Stenosis  2) Pre-Operative Evaluation    Labs and EKG reviewed. MRSA pending    Vitamin D treated    She has asked for a nicotine patch post surgery. We had a conversation about smoking and her healing process. I have told her she will not get patches and explained why. She has verbalized understanding. Preoperative Clearance  Per RCRI, the patient has a 0.4% risk of cardiac death, nonfatal MI, nonfatal cardiac arrest based on no risk factors. Per ACC/AHA guidelines, patient is low risk for a(n) intermediate risk surgery and may proceed to planned surgery with the above noted risk.     Yamileth Obrien NP

## 2021-02-24 LAB
BACTERIA SPEC CULT: NORMAL
BACTERIA SPEC CULT: NORMAL
EST. AVERAGE GLUCOSE BLD GHB EST-MCNC: 120 MG/DL
HBA1C MFR BLD: 5.8 % (ref 4–5.6)
SERVICE CMNT-IMP: NORMAL

## 2021-02-24 RX ORDER — CHOLECALCIFEROL TAB 125 MCG (5000 UNIT) 125 MCG
10000 TAB ORAL DAILY
Qty: 60 TAB | Refills: 0 | Status: SHIPPED | OUTPATIENT
Start: 2021-02-24 | End: 2021-03-26

## 2021-02-25 ENCOUNTER — HOSPITAL ENCOUNTER (OUTPATIENT)
Dept: PREADMISSION TESTING | Age: 36
Discharge: HOME | End: 2021-02-25
Payer: COMMERCIAL

## 2021-02-25 PROCEDURE — U0003 INFECTIOUS AGENT DETECTION BY NUCLEIC ACID (DNA OR RNA); SEVERE ACUTE RESPIRATORY SYNDROME CORONAVIRUS 2 (SARS-COV-2) (CORONAVIRUS DISEASE [COVID-19]), AMPLIFIED PROBE TECHNIQUE, MAKING USE OF HIGH THROUGHPUT TECHNOLOGIES AS DESCRIBED BY CMS-2020-01-R: HCPCS

## 2021-02-27 LAB — SARS-COV-2, COV2NT: NOT DETECTED

## 2021-03-11 RX ORDER — LEVOTHYROXINE SODIUM 150 UG/1
TABLET ORAL
Qty: 90 TAB | Refills: 3 | Status: SHIPPED | OUTPATIENT
Start: 2021-03-11 | End: 2021-05-05 | Stop reason: SDUPTHER

## 2021-04-14 ENCOUNTER — HOSPITAL ENCOUNTER (OUTPATIENT)
Dept: PREADMISSION TESTING | Age: 36
Discharge: HOME | End: 2021-04-14
Payer: COMMERCIAL

## 2021-04-14 VITALS
BODY MASS INDEX: 34.4 KG/M2 | SYSTOLIC BLOOD PRESSURE: 132 MMHG | HEART RATE: 112 BPM | DIASTOLIC BLOOD PRESSURE: 93 MMHG | WEIGHT: 201.5 LBS | HEIGHT: 64 IN | TEMPERATURE: 97.8 F | RESPIRATION RATE: 8 BRPM | OXYGEN SATURATION: 97 %

## 2021-04-14 LAB
25(OH)D3 SERPL-MCNC: 24.9 NG/ML (ref 30–100)
ABO + RH BLD: NORMAL
ALBUMIN SERPL-MCNC: 3.9 G/DL (ref 3.5–5)
ALBUMIN/GLOB SERPL: 1.1 {RATIO} (ref 1.1–2.2)
ALP SERPL-CCNC: 57 U/L (ref 45–117)
ALT SERPL-CCNC: 45 U/L (ref 12–78)
ANION GAP SERPL CALC-SCNC: 5 MMOL/L (ref 5–15)
APPEARANCE UR: CLEAR
APTT PPP: 26.5 SEC (ref 22.1–31)
AST SERPL-CCNC: 18 U/L (ref 15–37)
BACTERIA URNS QL MICRO: NEGATIVE /HPF
BASOPHILS # BLD: 0.1 K/UL (ref 0–0.1)
BASOPHILS NFR BLD: 1 % (ref 0–1)
BILIRUB SERPL-MCNC: 0.2 MG/DL (ref 0.2–1)
BILIRUB UR QL: NEGATIVE
BLOOD GROUP ANTIBODIES SERPL: NORMAL
BUN SERPL-MCNC: 7 MG/DL (ref 6–20)
BUN/CREAT SERPL: 13 (ref 12–20)
CALCIUM SERPL-MCNC: 9.5 MG/DL (ref 8.5–10.1)
CHLORIDE SERPL-SCNC: 108 MMOL/L (ref 97–108)
CO2 SERPL-SCNC: 23 MMOL/L (ref 21–32)
COLOR UR: NORMAL
CREAT SERPL-MCNC: 0.56 MG/DL (ref 0.55–1.02)
DIFFERENTIAL METHOD BLD: ABNORMAL
EOSINOPHIL # BLD: 0.3 K/UL (ref 0–0.4)
EOSINOPHIL NFR BLD: 3 % (ref 0–7)
EPITH CASTS URNS QL MICRO: NORMAL /LPF
ERYTHROCYTE [DISTWIDTH] IN BLOOD BY AUTOMATED COUNT: 13.5 % (ref 11.5–14.5)
EST. AVERAGE GLUCOSE BLD GHB EST-MCNC: 123 MG/DL
GLOBULIN SER CALC-MCNC: 3.5 G/DL (ref 2–4)
GLUCOSE SERPL-MCNC: 98 MG/DL (ref 65–100)
GLUCOSE UR STRIP.AUTO-MCNC: NEGATIVE MG/DL
HBA1C MFR BLD: 5.9 % (ref 4–5.6)
HCT VFR BLD AUTO: 39.7 % (ref 35–47)
HGB BLD-MCNC: 13.4 G/DL (ref 11.5–16)
HGB UR QL STRIP: NEGATIVE
HYALINE CASTS URNS QL MICRO: NORMAL /LPF (ref 0–5)
IMM GRANULOCYTES # BLD AUTO: 0.1 K/UL (ref 0–0.04)
IMM GRANULOCYTES NFR BLD AUTO: 1 % (ref 0–0.5)
INR PPP: 1 (ref 0.9–1.1)
KETONES UR QL STRIP.AUTO: NEGATIVE MG/DL
LEUKOCYTE ESTERASE UR QL STRIP.AUTO: NEGATIVE
LYMPHOCYTES # BLD: 3 K/UL (ref 0.8–3.5)
LYMPHOCYTES NFR BLD: 28 % (ref 12–49)
MCH RBC QN AUTO: 29.9 PG (ref 26–34)
MCHC RBC AUTO-ENTMCNC: 33.8 G/DL (ref 30–36.5)
MCV RBC AUTO: 88.6 FL (ref 80–99)
MONOCYTES # BLD: 0.6 K/UL (ref 0–1)
MONOCYTES NFR BLD: 6 % (ref 5–13)
NEUTS SEG # BLD: 6.4 K/UL (ref 1.8–8)
NEUTS SEG NFR BLD: 61 % (ref 32–75)
NITRITE UR QL STRIP.AUTO: NEGATIVE
NRBC # BLD: 0 K/UL (ref 0–0.01)
NRBC BLD-RTO: 0 PER 100 WBC
PH UR STRIP: 6.5 [PH] (ref 5–8)
PLATELET # BLD AUTO: 262 K/UL (ref 150–400)
PMV BLD AUTO: 9.1 FL (ref 8.9–12.9)
POTASSIUM SERPL-SCNC: 4.2 MMOL/L (ref 3.5–5.1)
PROT SERPL-MCNC: 7.4 G/DL (ref 6.4–8.2)
PROT UR STRIP-MCNC: NEGATIVE MG/DL
PROTHROMBIN TIME: 10.3 SEC (ref 9–11.1)
RBC # BLD AUTO: 4.48 M/UL (ref 3.8–5.2)
RBC #/AREA URNS HPF: NORMAL /HPF (ref 0–5)
SODIUM SERPL-SCNC: 136 MMOL/L (ref 136–145)
SP GR UR REFRACTOMETRY: 1.01 (ref 1–1.03)
SPECIMEN EXP DATE BLD: NORMAL
THERAPEUTIC RANGE,PTTT: NORMAL SECS (ref 58–77)
UA: UC IF INDICATED,UAUC: NORMAL
UROBILINOGEN UR QL STRIP.AUTO: 0.2 EU/DL (ref 0.2–1)
WBC # BLD AUTO: 10.5 K/UL (ref 3.6–11)
WBC URNS QL MICRO: NORMAL /HPF (ref 0–4)

## 2021-04-14 PROCEDURE — 36415 COLL VENOUS BLD VENIPUNCTURE: CPT

## 2021-04-14 PROCEDURE — 86901 BLOOD TYPING SEROLOGIC RH(D): CPT

## 2021-04-14 PROCEDURE — 85610 PROTHROMBIN TIME: CPT

## 2021-04-14 PROCEDURE — 80053 COMPREHEN METABOLIC PANEL: CPT

## 2021-04-14 PROCEDURE — 81001 URINALYSIS AUTO W/SCOPE: CPT

## 2021-04-14 PROCEDURE — 85730 THROMBOPLASTIN TIME PARTIAL: CPT

## 2021-04-14 PROCEDURE — 85025 COMPLETE CBC W/AUTO DIFF WBC: CPT

## 2021-04-14 PROCEDURE — 82306 VITAMIN D 25 HYDROXY: CPT

## 2021-04-14 PROCEDURE — 83036 HEMOGLOBIN GLYCOSYLATED A1C: CPT

## 2021-04-14 RX ORDER — GABAPENTIN 300 MG/1
300 CAPSULE ORAL 3 TIMES DAILY
COMMUNITY
End: 2021-08-26

## 2021-04-14 RX ORDER — BISMUTH SUBSALICYLATE 262 MG
1 TABLET,CHEWABLE ORAL DAILY
COMMUNITY

## 2021-04-14 RX ORDER — TIZANIDINE HYDROCHLORIDE 2 MG/1
4 CAPSULE, GELATIN COATED ORAL 3 TIMES DAILY
COMMUNITY
End: 2021-09-08

## 2021-04-14 RX ORDER — HYDROCODONE BITARTRATE AND ACETAMINOPHEN 7.5; 325 MG/1; MG/1
1 TABLET ORAL 3 TIMES DAILY
COMMUNITY
End: 2021-04-28

## 2021-04-14 NOTE — PERIOP NOTES
[address] , [address] [facility]   MAIN OR                                  [phone]   MAIN PRE OP                          [phone]                                                                                AMBULATORY PRE OP          [phone]  PRE-ADMISSION TESTING    [phone]   Surgery Date: Monday, April 26        Is surgery arrival time given by surgeon? NO  If NO, [address] [facility] staff will call you between 3 and 7pm the day before your surgery with your arrival time. (If your surgery is on a Monday, we will call you the Friday before.)    Call [phone] after 7pm Monday-Friday if you did not receive this call.     INSTRUCTIONS BEFORE YOUR SURGERY   When You  Arrive Arrive at the [address] [facility] on the day of your surgery  Have your insurance card, photo ID, and any copayment (if needed)   Food   and   Drink NO food or drink after midnight the night before surgery    This means NO water, gum, mints, coffee, juice, etc.  No alcohol (beer, wine, liquor) 24 hours before and after surgery   Medications to   TAKE   Morning of Surgery MEDICATIONS TO TAKE THE MORNING OF SURGERY WITH A SIP OF WATER:    Diphenhydramine   Duloxetine   Gabapentin   Hydrocodone OR Oxycodone (Not both)   Hydroxyzine (Vistaril)   Tizanidine   Unithroid    Hold Linzess Day of Surgery   Medications  To  STOP      7 days before surgery  Non-Steroidal anti-inflammatory Drugs (NSAID's): for example, Ibuprofen (Advil, Motrin), Naproxen (Aleve)   Aspirin, if taking for pain    Herbal supplements, vitamins, and fish oil   Other:  (Pain medications not listed above, including Tylenol may be taken)        Bathing Clothing  Jewelry  Valuables      If you shower the morning of surgery, please do not apply anything to your skin (lotions, powders, deodorant, or makeup, especially mascara)   Follow Chlorhexidine Care Fusion body wash instructions provided to you during PAT appointment. Begin 3 days prior to surgery.  Do not shave or trim anywhere 24 hours before surgery   Wear your hair loose or down; no pony-tails, buns, or metal hair clips   Wear loose, comfortable, clean clothes   Wear glasses instead of contacts  [employer] money, valuables, and jewelry, including body piercings, at home   Going Home - or Spending the Night    ADMITS: If your doctor is keeping you in the hospital after surgery, leave personal belongings/luggage in your car until you have a hospital room number. Hospital discharge time is 12 noon  Drivers must be here before 12 noon unless you are told differently   Special Instructions It is now mandated that all surgical patients be tested for COVID-19 prior to surgery. Testing has to be exactly 4 days prior to surgery. Your COVID test date is Thursday, April 22 between 8:00 am and 11:00 am.       COVID testing will be performed curbside at the [address] [place] [address]. There will be signs leading you to the testing site. You will need to bring a photo ID with you to be swabbed. Patients are advised to self-quarantine at home after testing and prior to your surgery date. You will be notified if your results are positive. What to watch for:   Coronavirus (COVID-19) affects different people in different ways   It also appears with a wide range of symptoms from mild to severe   Signs usually appear 2-14 days after exposure     If you develop any of the following, notify your doctor immediately:  o Fever  o Chills, with or without a shiver  o Muscle pain  o Headache  o Sore throat  o Dry cough  o New loss of taste or smell  o Tiredness      If you develop any of the following, call 911:  o Shortness of breath  o Difficulty breathing  o Chest pain  o New confusion  o Blueness of fingers and/or lips     Follow all instructions so your surgery wont be cancelled. Please, be on time. If a situation occurs and you are delayed the day of surgery, call [phone]. If your physical condition changes (like a fever, cold, flu, etc.) call your surgeon. Home medication(s) reviewed and verified via  LIST   VERBAL   during PAT appointment. The patient was contacted by  IN-PERSON  The patient verbalizes understanding of all instructions and DOES NOT   need reinforcement.

## 2021-04-15 LAB
BACTERIA SPEC CULT: NORMAL
BACTERIA SPEC CULT: NORMAL
SERVICE CMNT-IMP: NORMAL

## 2021-04-15 RX ORDER — CHOLECALCIFEROL TAB 125 MCG (5000 UNIT) 125 MCG
5000 TAB ORAL DAILY
Qty: 30 TAB | Refills: 0 | Status: SHIPPED | OUTPATIENT
Start: 2021-04-15 | End: 2021-05-15

## 2021-04-15 NOTE — H&P
History and Physical    Patient: Alexa Novoa MRN: [id number]  SSN: [id number]    YOB: 1985  Age: 37 y.o. Sex: female      CC: Low back pain    Subjective:      Alexa Novoa is a 37 y.o. female who has been sent for a pre-operative evaluation by Dr. Isaac Summers for sx on 21. Thu Hunter was here in February for her surgery but got cancelled because she was smoking. She quit on 3/1/21. She comes today in a wheelchair as her back pain has gotten worse. She notes walking long distances her right leg will start to shake. For full pain history see previous H/P. Past Medical History:   Diagnosis Date    Anxiety and depression     IBS (irritable bowel syndrome)     Poor dentition     Thyroid cancer (HCC)      Past Surgical History:   Procedure Laterality Date    HX LUMBAR LAMINECTOMY  2008    HX THYROIDECTOMY      HX TUBAL LIGATION        Family History   Problem Relation Age of Onset    Ulcerative Colitis Mother     Diabetes Father     Hypertension Father     Elevated Lipids Father     Hypertension Maternal Grandmother     Cancer Maternal Grandmother     Cancer Maternal Grandfather     Anesth Problems Neg Hx     Clotting Disorder Neg Hx      Social History     Tobacco Use    Smoking status: Former Smoker     Packs/day: 1.00     Types: Cigarettes     Quit date: 3/1/2021     Years since quittin.1    Smokeless tobacco: Never Used   Substance Use Topics    Alcohol use: No    Drug use: No      Prior to Admission medications    Medication Sig Start Date End Date Taking? Authorizing Provider   gabapentin (NEURONTIN) 300 mg capsule Take 300 mg by mouth three (3) times daily. Yes Provider, Historical   tiZANidine (ZANAFLEX) 2 mg capsule Take 2 mg by mouth three (3) times daily. Yes Provider, Historical   HYDROcodone-acetaminophen (NORCO) 7.5-325 mg per tablet Take 1 Tab by mouth three (3) times daily.    Yes Provider, Historical   multivitamin (ONE A DAY) tablet Take 1 Tab by mouth daily. [employer] Brand   Yes Provider, Historical   multivitamin with minerals (HAIR,SKIN AND NAILS PO) Take  by mouth. @@ Gummies in AM   Yes Provider, Historical   Unithroid 150 mcg tablet take 1 tablet by mouth daily BEFORE BREAKFAST (BRAND MEDICALLY NECESSARY) 3/11/21  Yes Justin Deleon MD   diphenhydrAMINE (Benadryl Allergy) 25 mg tablet Take 25 mg by mouth every evening. Yes Provider, Historical   oxycodone HCl (OXYCODONE PO) Take 1 Tab by mouth three (3) times daily. Yes Provider, Historical   hydrOXYzine pamoate (VISTARIL) 50 mg capsule take 1 capsule by mouth every 8 hours if needed for anxiety 2/18/21  Yes Beth Robbins NP   DULoxetine (CYMBALTA) 60 mg capsule Take 1 Cap by mouth daily. 1/19/21  Yes Beth Robbins NP   linaCLOtide (Linzess) 145 mcg cap capsule Take 1 Cap by mouth Daily (before breakfast). 1/19/21  Yes Beth Robbins NP   cholecalciferol (Vitamin D3) (5000 Units/125 mcg) tab tablet Take 1 Tab by mouth daily for 30 days. Indications: low vitamin D levels 4/15/21 5/15/21  Yolis Calles NP        Allergies   Allergen Reactions    Latex Hives    Bactrim [Sulfamethoprim] Other (comments)     unresponsive      Morphine Nausea and Vomiting       Review of Systems:  Constitutional: Negative for chills and fever  HENT: Negative for congestion and sore throat  Eyes: negative for blurred vision and double vision  Respiratory: Negative for cough, shortness of breath and wheezing  Mouth: Negative for loose, broken or chipped teeth. Cardiovascular: Negative for chest pain and palpitations  Gastrointestinal: Negative for abdominal pain, constipation, diarrhea and nausea  Genitourinary: Negative for dysuria and hematuria  Musculoskeletal: Low back pain  Skin: Negative for rash, open wounds. Negative for bruises easily  Neurological: Negative for dizziness, tremors and headaches  Psychiatric: Negative for depression. The patient is not nervous/anxious.     Objective: Vitals:    04/14/21 1518 04/14/21 1600   BP: (!) 132/93    Pulse: (!) 116 (!) 112   Resp: 8    Temp: 97.8 °F (36.6 °C)    SpO2: 97%    Weight: 91.4 kg (201 lb 8 oz)    Height: 5' 4\" (1.626 m)         Physical Exam:  Constitutional:  Appears well,  No Acute Distress, Vitals noted  Psychiatric:   Affect normal, Alert and Oriented to person/place/time    Eyes:   Pupils equally round and reactive, EOMI, conjunctiva clear, eyelids normal  ENT:   External ears and nose normal, teeth normal, gums normal, TMs and Orophyarynx normal  Neck:   General inspection and Thyroid normal.  No abnormal cervical or supraclavicular nodes    Lungs:   Clear to auscultation, good respiratory effort  Heart: Ausculation normal.  Regular rhythm. Tachycardia. No cardiac murmurs. No carotid bruits or palpable thrills  Chest wall normal  Musculoskeletal: Gait antalgic  Extremities:   Without edema, good peripheral pulses  Skin:   Warm to palpation, without rashes, bruising, or suspicious lesions     Recent Results (from the past 168 hour(s))   TYPE & SCREEN    Collection Time: 04/14/21  4:18 PM   Result Value Ref Range    Crossmatch Expiration 04/28/2021,2359     ABO/Rh(D) A NEGATIVE     Antibody screen NEG    CBC WITH AUTOMATED DIFF    Collection Time: 04/14/21  4:18 PM   Result Value Ref Range    WBC 10.5 3.6 - 11.0 K/uL    RBC 4.48 3.80 - 5.20 M/uL    HGB 13.4 11.5 - 16.0 g/dL    HCT 39.7 35.0 - 47.0 %    MCV 88.6 80.0 - 99.0 FL    MCH 29.9 26.0 - 34.0 PG    MCHC 33.8 30.0 - 36.5 g/dL    RDW 13.5 11.5 - 14.5 %    PLATELET 302 717 - 827 K/uL    MPV 9.1 8.9 - 12.9 FL    NRBC 0.0 0  WBC    ABSOLUTE NRBC 0.00 0.00 - 0.01 K/uL    NEUTROPHILS 61 32 - 75 %    LYMPHOCYTES 28 12 - 49 %    MONOCYTES 6 5 - 13 %    EOSINOPHILS 3 0 - 7 %    BASOPHILS 1 0 - 1 %    IMMATURE GRANULOCYTES 1 (H) 0.0 - 0.5 %    ABS. NEUTROPHILS 6.4 1.8 - 8.0 K/UL    ABS. LYMPHOCYTES 3.0 0.8 - 3.5 K/UL    ABS. MONOCYTES 0.6 0.0 - 1.0 K/UL    ABS.  EOSINOPHILS 0.3 0.0 - 0.4 K/UL    ABS. BASOPHILS 0.1 0.0 - 0.1 K/UL    ABS. IMM. GRANS. 0.1 (H) 0.00 - 0.04 K/UL    DF AUTOMATED     METABOLIC PANEL, COMPREHENSIVE    Collection Time: 04/14/21  4:18 PM   Result Value Ref Range    Sodium 136 136 - 145 mmol/L    Potassium 4.2 3.5 - 5.1 mmol/L    Chloride 108 97 - 108 mmol/L    CO2 23 21 - 32 mmol/L    Anion gap 5 5 - 15 mmol/L    Glucose 98 65 - 100 mg/dL    BUN 7 6 - 20 MG/DL    Creatinine 0.56 0.55 - 1.02 MG/DL    BUN/Creatinine ratio 13 12 - 20      GFR est AA >60 >60 ml/min/1.73m2    GFR est non-AA >60 >60 ml/min/1.73m2    Calcium 9.5 8.5 - 10.1 MG/DL    Bilirubin, total 0.2 0.2 - 1.0 MG/DL    ALT (SGPT) 45 12 - 78 U/L    AST (SGOT) 18 15 - 37 U/L    Alk.  phosphatase 57 45 - 117 U/L    Protein, total 7.4 6.4 - 8.2 g/dL    Albumin 3.9 3.5 - 5.0 g/dL    Globulin 3.5 2.0 - 4.0 g/dL    A-G Ratio 1.1 1.1 - 2.2     HEMOGLOBIN A1C WITH EAG    Collection Time: 04/14/21  4:18 PM   Result Value Ref Range    Hemoglobin A1c 5.9 (H) 4.0 - 5.6 %    Est. average glucose 123 mg/dL   PROTHROMBIN TIME + INR    Collection Time: 04/14/21  4:18 PM   Result Value Ref Range    INR 1.0 0.9 - 1.1      Prothrombin time 10.3 9.0 - 11.1 sec   PTT    Collection Time: 04/14/21  4:18 PM   Result Value Ref Range    aPTT 26.5 22.1 - 31.0 sec    aPTT, therapeutic range     58.0 - 77.0 SECS   URINALYSIS W/ REFLEX CULTURE    Collection Time: 04/14/21  4:18 PM    Specimen: Urine   Result Value Ref Range    Color YELLOW/STRAW      Appearance CLEAR CLEAR      Specific gravity 1.010 1.003 - 1.030      pH (UA) 6.5 5.0 - 8.0      Protein Negative NEG mg/dL    Glucose Negative NEG mg/dL    Ketone Negative NEG mg/dL    Bilirubin Negative NEG      Blood Negative NEG      Urobilinogen 0.2 0.2 - 1.0 EU/dL    Nitrites Negative NEG      Leukocyte Esterase Negative NEG      WBC 0-4 0 - 4 /hpf    RBC 0-5 0 - 5 /hpf    Epithelial cells FEW FEW /lpf    Bacteria Negative NEG /hpf    UA:UC IF INDICATED CULTURE NOT INDICATED BY UA RESULT CNI      Hyaline cast 0-2 0 - 5 /lpf   VITAMIN D, 25 HYDROXY    Collection Time: 04/14/21  4:18 PM   Result Value Ref Range    Vitamin D 25-Hydroxy 24.9 (L) 30 - 100 ng/mL         Assessment:     Lumbar Stenosis  Low Vitamin D    Plan:     L4-S1 Laminectomy Revision  Treated Vitamin D with 5,000 units daily x 30 days  MRSA pending  Reviewed labs in CC.     Signed By: María Avelar NP     April 15, 2021

## 2021-04-22 ENCOUNTER — HOSPITAL ENCOUNTER (OUTPATIENT)
Dept: PREADMISSION TESTING | Age: 36
Discharge: HOME | End: 2021-04-22
Payer: COMMERCIAL

## 2021-04-22 PROCEDURE — U0003 INFECTIOUS AGENT DETECTION BY NUCLEIC ACID (DNA OR RNA); SEVERE ACUTE RESPIRATORY SYNDROME CORONAVIRUS 2 (SARS-COV-2) (CORONAVIRUS DISEASE [COVID-19]), AMPLIFIED PROBE TECHNIQUE, MAKING USE OF HIGH THROUGHPUT TECHNOLOGIES AS DESCRIBED BY CMS-2020-01-R: HCPCS

## 2021-04-23 ENCOUNTER — ANESTHESIA EVENT (OUTPATIENT)
Dept: SURGERY | Age: 36
DRG: 455 | End: 2021-04-23
Payer: COMMERCIAL

## 2021-04-23 LAB — SARS-COV-2, COV2NT: NOT DETECTED

## 2021-04-26 ENCOUNTER — HOSPITAL ENCOUNTER (INPATIENT)
Age: 36
LOS: 2 days | Discharge: HOME HEALTH | DRG: 455 | End: 2021-04-28
Attending: ORTHOPAEDIC SURGERY | Admitting: ORTHOPAEDIC SURGERY
Payer: COMMERCIAL

## 2021-04-26 ENCOUNTER — ANESTHESIA (OUTPATIENT)
Dept: SURGERY | Age: 36
DRG: 455 | End: 2021-04-26
Payer: COMMERCIAL

## 2021-04-26 ENCOUNTER — APPOINTMENT (OUTPATIENT)
Dept: GENERAL RADIOLOGY | Age: 36
DRG: 455 | End: 2021-04-26
Attending: ORTHOPAEDIC SURGERY
Payer: COMMERCIAL

## 2021-04-26 DIAGNOSIS — G89.18 POST-OP PAIN: Primary | ICD-10-CM

## 2021-04-26 PROBLEM — M48.062 LUMBAR STENOSIS WITH NEUROGENIC CLAUDICATION: Status: ACTIVE | Noted: 2021-04-26

## 2021-04-26 PROCEDURE — C1713 ANCHOR/SCREW BN/BN,TIS/BN: HCPCS | Performed by: ORTHOPAEDIC SURGERY

## 2021-04-26 PROCEDURE — 74011000250 HC RX REV CODE- 250: Performed by: ORTHOPAEDIC SURGERY

## 2021-04-26 PROCEDURE — 74011250636 HC RX REV CODE- 250/636: Performed by: ORTHOPAEDIC SURGERY

## 2021-04-26 PROCEDURE — 77030039267 HC ADH SKN EXOFIN S2SG -B: Performed by: ORTHOPAEDIC SURGERY

## 2021-04-26 PROCEDURE — C1889 IMPLANT/INSERT DEVICE, NOC: HCPCS | Performed by: ORTHOPAEDIC SURGERY

## 2021-04-26 PROCEDURE — 94760 N-INVAS EAR/PLS OXIMETRY 1: CPT

## 2021-04-26 PROCEDURE — 77030040466 HC GRFT MATRIX VIBONE REGE -I1: Performed by: ORTHOPAEDIC SURGERY

## 2021-04-26 PROCEDURE — 0SB20ZZ EXCISION OF LUMBAR VERTEBRAL DISC, OPEN APPROACH: ICD-10-PCS | Performed by: ORTHOPAEDIC SURGERY

## 2021-04-26 PROCEDURE — 77030004391 HC BUR FLUT MEDT -C: Performed by: ORTHOPAEDIC SURGERY

## 2021-04-26 PROCEDURE — 76060000038 HC ANESTHESIA 3.5 TO 4 HR: Performed by: ORTHOPAEDIC SURGERY

## 2021-04-26 PROCEDURE — 77030012406 HC DRN WND PENRS BARD -A: Performed by: ORTHOPAEDIC SURGERY

## 2021-04-26 PROCEDURE — 74011250636 HC RX REV CODE- 250/636: Performed by: NURSE ANESTHETIST, CERTIFIED REGISTERED

## 2021-04-26 PROCEDURE — 77010033678 HC OXYGEN DAILY

## 2021-04-26 PROCEDURE — 77030027138 HC INCENT SPIROMETER -A

## 2021-04-26 PROCEDURE — 77030026188 HC BN CANC CHP CRSH PR LIFV -E: Performed by: ORTHOPAEDIC SURGERY

## 2021-04-26 PROCEDURE — 77030031139 HC SUT VCRL2 J&J -A: Performed by: ORTHOPAEDIC SURGERY

## 2021-04-26 PROCEDURE — 76010000174 HC OR TIME 3.5 TO 4 HR INTENSV-TIER 1: Performed by: ORTHOPAEDIC SURGERY

## 2021-04-26 PROCEDURE — 77030002982 HC SUT POLYSRB J&J -A: Performed by: ORTHOPAEDIC SURGERY

## 2021-04-26 PROCEDURE — 77030019908 HC STETH ESOPH SIMS -A: Performed by: ANESTHESIOLOGY

## 2021-04-26 PROCEDURE — 76210000016 HC OR PH I REC 1 TO 1.5 HR: Performed by: ORTHOPAEDIC SURGERY

## 2021-04-26 PROCEDURE — 0SG3071 FUSION OF LUMBOSACRAL JOINT WITH AUTOLOGOUS TISSUE SUBSTITUTE, POSTERIOR APPROACH, POSTERIOR COLUMN, OPEN APPROACH: ICD-10-PCS | Performed by: ORTHOPAEDIC SURGERY

## 2021-04-26 PROCEDURE — 74011000250 HC RX REV CODE- 250: Performed by: NURSE ANESTHETIST, CERTIFIED REGISTERED

## 2021-04-26 PROCEDURE — 77030040922 HC BLNKT HYPOTHRM STRY -A

## 2021-04-26 PROCEDURE — 2709999900 HC NON-CHARGEABLE SUPPLY

## 2021-04-26 PROCEDURE — 2709999900 HC NON-CHARGEABLE SUPPLY: Performed by: ORTHOPAEDIC SURGERY

## 2021-04-26 PROCEDURE — 77030005513 HC CATH URETH FOL11 MDII -B: Performed by: ORTHOPAEDIC SURGERY

## 2021-04-26 PROCEDURE — 77030026438 HC STYL ET INTUB CARD -A: Performed by: ANESTHESIOLOGY

## 2021-04-26 PROCEDURE — 77030040361 HC SLV COMPR DVT MDII -B

## 2021-04-26 PROCEDURE — 77030008684 HC TU ET CUF COVD -B: Performed by: ANESTHESIOLOGY

## 2021-04-26 PROCEDURE — 74011250636 HC RX REV CODE- 250/636: Performed by: ANESTHESIOLOGY

## 2021-04-26 PROCEDURE — 77030003666 HC NDL SPINAL BD -A: Performed by: ORTHOPAEDIC SURGERY

## 2021-04-26 PROCEDURE — 0SG00AJ FUSION OF LUMBAR VERTEBRAL JOINT WITH INTERBODY FUSION DEVICE, POSTERIOR APPROACH, ANTERIOR COLUMN, OPEN APPROACH: ICD-10-PCS | Performed by: ORTHOPAEDIC SURGERY

## 2021-04-26 PROCEDURE — 01NB0ZZ RELEASE LUMBAR NERVE, OPEN APPROACH: ICD-10-PCS | Performed by: ORTHOPAEDIC SURGERY

## 2021-04-26 PROCEDURE — 77030018723 HC ELCTRD BLD COVD -A: Performed by: ORTHOPAEDIC SURGERY

## 2021-04-26 PROCEDURE — 74011250637 HC RX REV CODE- 250/637: Performed by: ORTHOPAEDIC SURGERY

## 2021-04-26 PROCEDURE — 77030033067 HC SUT PDO STRATFX SPIR J&J -B: Performed by: ORTHOPAEDIC SURGERY

## 2021-04-26 PROCEDURE — 77030036660

## 2021-04-26 PROCEDURE — 77030014650 HC SEAL MTRX FLOSEL BAXT -C: Performed by: ORTHOPAEDIC SURGERY

## 2021-04-26 PROCEDURE — 77030040356 HC CORD BPLR FRCP COVD -A: Performed by: ORTHOPAEDIC SURGERY

## 2021-04-26 PROCEDURE — 00NY0ZZ RELEASE LUMBAR SPINAL CORD, OPEN APPROACH: ICD-10-PCS | Performed by: ORTHOPAEDIC SURGERY

## 2021-04-26 PROCEDURE — C9290 INJ, BUPIVACAINE LIPOSOME: HCPCS | Performed by: ORTHOPAEDIC SURGERY

## 2021-04-26 DEVICE — BONE CHIP CANC CRSH 1-8MM 30ML --: Type: IMPLANTABLE DEVICE | Site: SPINE LUMBAR | Status: FUNCTIONAL

## 2021-04-26 DEVICE — SCR BNE SPNE 6.5X50MM TI -- STREAMLINE TL: Type: IMPLANTABLE DEVICE | Site: SPINE LUMBAR | Status: FUNCTIONAL

## 2021-04-26 DEVICE — SCR SET SPNE STREAMLINE TL --: Type: IMPLANTABLE DEVICE | Site: SPINE LUMBAR | Status: FUNCTIONAL

## 2021-04-26 DEVICE — ROD SPNE CURVED 5.5X40MM --: Type: IMPLANTABLE DEVICE | Site: SPINE LUMBAR | Status: FUNCTIONAL

## 2021-04-26 DEVICE — 5.5MM CURVED ROD 60MM TI ALLOY
Type: IMPLANTABLE DEVICE | Site: SPINE LUMBAR | Status: FUNCTIONAL
Brand: TAURUS

## 2021-04-26 DEVICE — SCR BNE SPNE 6.5X45MM TI -- STREAMLINE TL: Type: IMPLANTABLE DEVICE | Site: SPINE LUMBAR | Status: FUNCTIONAL

## 2021-04-26 DEVICE — Z DUP USE 2731790 SUBSTITUTE BNE 10CC VIABLE MTRX VIBNE: Type: IMPLANTABLE DEVICE | Site: SPINE LUMBAR | Status: FUNCTIONAL

## 2021-04-26 DEVICE — DURAGEN® SUTURABLE DURAL REGENERATION MATRIX, 2 IN X 2 IN (5 CM X 5 CM)
Type: IMPLANTABLE DEVICE | Site: SPINE LUMBAR | Status: FUNCTIONAL
Brand: DURAGEN® SUTURABLE

## 2021-04-26 DEVICE — SPACER SPNL BULL 22X11X14 MM INTBDY THORLUM PEEK: Type: IMPLANTABLE DEVICE | Site: SPINE LUMBAR | Status: FUNCTIONAL

## 2021-04-26 RX ORDER — FENTANYL CITRATE 50 UG/ML
INJECTION, SOLUTION INTRAMUSCULAR; INTRAVENOUS AS NEEDED
Status: DISCONTINUED | OUTPATIENT
Start: 2021-04-26 | End: 2021-04-26 | Stop reason: HOSPADM

## 2021-04-26 RX ORDER — SODIUM CHLORIDE, SODIUM LACTATE, POTASSIUM CHLORIDE, CALCIUM CHLORIDE 600; 310; 30; 20 MG/100ML; MG/100ML; MG/100ML; MG/100ML
150 INJECTION, SOLUTION INTRAVENOUS CONTINUOUS
Status: DISCONTINUED | OUTPATIENT
Start: 2021-04-26 | End: 2021-04-26 | Stop reason: HOSPADM

## 2021-04-26 RX ORDER — MIDAZOLAM HYDROCHLORIDE 1 MG/ML
INJECTION, SOLUTION INTRAMUSCULAR; INTRAVENOUS AS NEEDED
Status: DISCONTINUED | OUTPATIENT
Start: 2021-04-26 | End: 2021-04-26 | Stop reason: HOSPADM

## 2021-04-26 RX ORDER — HYDROMORPHONE HYDROCHLORIDE 1 MG/ML
0.5 INJECTION, SOLUTION INTRAMUSCULAR; INTRAVENOUS; SUBCUTANEOUS
Status: DISCONTINUED | OUTPATIENT
Start: 2021-04-26 | End: 2021-04-26 | Stop reason: HOSPADM

## 2021-04-26 RX ORDER — NEOSTIGMINE METHYLSULFATE 1 MG/ML
INJECTION, SOLUTION INTRAVENOUS AS NEEDED
Status: DISCONTINUED | OUTPATIENT
Start: 2021-04-26 | End: 2021-04-26 | Stop reason: HOSPADM

## 2021-04-26 RX ORDER — PROPOFOL 10 MG/ML
INJECTION, EMULSION INTRAVENOUS AS NEEDED
Status: DISCONTINUED | OUTPATIENT
Start: 2021-04-26 | End: 2021-04-26 | Stop reason: HOSPADM

## 2021-04-26 RX ORDER — SODIUM CHLORIDE 9 MG/ML
125 INJECTION, SOLUTION INTRAVENOUS CONTINUOUS
Status: DISPENSED | OUTPATIENT
Start: 2021-04-26 | End: 2021-04-27

## 2021-04-26 RX ORDER — SODIUM CHLORIDE 0.9 % (FLUSH) 0.9 %
5-40 SYRINGE (ML) INJECTION AS NEEDED
Status: DISCONTINUED | OUTPATIENT
Start: 2021-04-26 | End: 2021-04-28 | Stop reason: HOSPADM

## 2021-04-26 RX ORDER — HYDROMORPHONE HCL/0.9% NACL/PF 0.5 MG/ML
PLASTIC BAG, INJECTION (ML) INTRAVENOUS
Status: DISPENSED | OUTPATIENT
Start: 2021-04-26 | End: 2021-04-27

## 2021-04-26 RX ORDER — LEVOTHYROXINE SODIUM 150 UG/1
150 TABLET ORAL
Status: DISCONTINUED | OUTPATIENT
Start: 2021-04-27 | End: 2021-04-28 | Stop reason: HOSPADM

## 2021-04-26 RX ORDER — SODIUM CHLORIDE, SODIUM LACTATE, POTASSIUM CHLORIDE, CALCIUM CHLORIDE 600; 310; 30; 20 MG/100ML; MG/100ML; MG/100ML; MG/100ML
125 INJECTION, SOLUTION INTRAVENOUS CONTINUOUS
Status: DISCONTINUED | OUTPATIENT
Start: 2021-04-26 | End: 2021-04-26 | Stop reason: HOSPADM

## 2021-04-26 RX ORDER — PROPOFOL 10 MG/ML
INJECTION, EMULSION INTRAVENOUS
Status: DISCONTINUED | OUTPATIENT
Start: 2021-04-26 | End: 2021-04-26 | Stop reason: HOSPADM

## 2021-04-26 RX ORDER — ONDANSETRON 2 MG/ML
INJECTION INTRAMUSCULAR; INTRAVENOUS AS NEEDED
Status: DISCONTINUED | OUTPATIENT
Start: 2021-04-26 | End: 2021-04-26 | Stop reason: HOSPADM

## 2021-04-26 RX ORDER — POLYETHYLENE GLYCOL 3350 17 G/17G
17 POWDER, FOR SOLUTION ORAL DAILY
Status: DISCONTINUED | OUTPATIENT
Start: 2021-04-27 | End: 2021-04-28 | Stop reason: HOSPADM

## 2021-04-26 RX ORDER — SODIUM CHLORIDE 0.9 % (FLUSH) 0.9 %
5-40 SYRINGE (ML) INJECTION EVERY 8 HOURS
Status: DISCONTINUED | OUTPATIENT
Start: 2021-04-26 | End: 2021-04-28 | Stop reason: HOSPADM

## 2021-04-26 RX ORDER — OXYCODONE HYDROCHLORIDE 5 MG/1
5 TABLET ORAL
Status: DISCONTINUED | OUTPATIENT
Start: 2021-04-26 | End: 2021-04-28 | Stop reason: HOSPADM

## 2021-04-26 RX ORDER — HYDROMORPHONE HYDROCHLORIDE 1 MG/ML
0.5 INJECTION, SOLUTION INTRAMUSCULAR; INTRAVENOUS; SUBCUTANEOUS
Status: DISPENSED | OUTPATIENT
Start: 2021-04-26 | End: 2021-04-27

## 2021-04-26 RX ORDER — DEXAMETHASONE SODIUM PHOSPHATE 4 MG/ML
INJECTION, SOLUTION INTRA-ARTICULAR; INTRALESIONAL; INTRAMUSCULAR; INTRAVENOUS; SOFT TISSUE AS NEEDED
Status: DISCONTINUED | OUTPATIENT
Start: 2021-04-26 | End: 2021-04-26 | Stop reason: HOSPADM

## 2021-04-26 RX ORDER — DIPHENHYDRAMINE HYDROCHLORIDE 50 MG/ML
12.5 INJECTION, SOLUTION INTRAMUSCULAR; INTRAVENOUS AS NEEDED
Status: DISCONTINUED | OUTPATIENT
Start: 2021-04-26 | End: 2021-04-26 | Stop reason: HOSPADM

## 2021-04-26 RX ORDER — CYCLOBENZAPRINE HCL 10 MG
10 TABLET ORAL
Status: DISCONTINUED | OUTPATIENT
Start: 2021-04-26 | End: 2021-04-28 | Stop reason: HOSPADM

## 2021-04-26 RX ORDER — ROCURONIUM BROMIDE 10 MG/ML
INJECTION, SOLUTION INTRAVENOUS AS NEEDED
Status: DISCONTINUED | OUTPATIENT
Start: 2021-04-26 | End: 2021-04-26 | Stop reason: HOSPADM

## 2021-04-26 RX ORDER — LIDOCAINE HYDROCHLORIDE 10 MG/ML
0.1 INJECTION, SOLUTION EPIDURAL; INFILTRATION; INTRACAUDAL; PERINEURAL AS NEEDED
Status: DISCONTINUED | OUTPATIENT
Start: 2021-04-26 | End: 2021-04-26 | Stop reason: HOSPADM

## 2021-04-26 RX ORDER — FACIAL-BODY WIPES
10 EACH TOPICAL DAILY PRN
Status: DISCONTINUED | OUTPATIENT
Start: 2021-04-28 | End: 2021-04-28 | Stop reason: HOSPADM

## 2021-04-26 RX ORDER — NALOXONE HYDROCHLORIDE 0.4 MG/ML
0.4 INJECTION, SOLUTION INTRAMUSCULAR; INTRAVENOUS; SUBCUTANEOUS AS NEEDED
Status: DISCONTINUED | OUTPATIENT
Start: 2021-04-26 | End: 2021-04-28 | Stop reason: HOSPADM

## 2021-04-26 RX ORDER — FAMOTIDINE 20 MG/1
20 TABLET, FILM COATED ORAL 2 TIMES DAILY
Status: DISCONTINUED | OUTPATIENT
Start: 2021-04-26 | End: 2021-04-28 | Stop reason: HOSPADM

## 2021-04-26 RX ORDER — LIDOCAINE HYDROCHLORIDE 20 MG/ML
INJECTION, SOLUTION EPIDURAL; INFILTRATION; INTRACAUDAL; PERINEURAL AS NEEDED
Status: DISCONTINUED | OUTPATIENT
Start: 2021-04-26 | End: 2021-04-26 | Stop reason: HOSPADM

## 2021-04-26 RX ORDER — ACETAMINOPHEN 325 MG/1
650 TABLET ORAL
Status: DISCONTINUED | OUTPATIENT
Start: 2021-04-26 | End: 2021-04-28 | Stop reason: HOSPADM

## 2021-04-26 RX ORDER — SUCCINYLCHOLINE CHLORIDE 20 MG/ML
INJECTION INTRAMUSCULAR; INTRAVENOUS AS NEEDED
Status: DISCONTINUED | OUTPATIENT
Start: 2021-04-26 | End: 2021-04-26 | Stop reason: HOSPADM

## 2021-04-26 RX ORDER — DULOXETIN HYDROCHLORIDE 30 MG/1
60 CAPSULE, DELAYED RELEASE ORAL DAILY
Status: DISCONTINUED | OUTPATIENT
Start: 2021-04-27 | End: 2021-04-28 | Stop reason: HOSPADM

## 2021-04-26 RX ORDER — ONDANSETRON 2 MG/ML
4 INJECTION INTRAMUSCULAR; INTRAVENOUS AS NEEDED
Status: DISCONTINUED | OUTPATIENT
Start: 2021-04-26 | End: 2021-04-26 | Stop reason: HOSPADM

## 2021-04-26 RX ORDER — AMOXICILLIN 250 MG
1 CAPSULE ORAL 2 TIMES DAILY
Status: DISCONTINUED | OUTPATIENT
Start: 2021-04-26 | End: 2021-04-28 | Stop reason: HOSPADM

## 2021-04-26 RX ORDER — HYDROMORPHONE HYDROCHLORIDE 2 MG/ML
INJECTION, SOLUTION INTRAMUSCULAR; INTRAVENOUS; SUBCUTANEOUS AS NEEDED
Status: DISCONTINUED | OUTPATIENT
Start: 2021-04-26 | End: 2021-04-26 | Stop reason: HOSPADM

## 2021-04-26 RX ORDER — OXYCODONE HYDROCHLORIDE 5 MG/1
10 TABLET ORAL
Status: DISCONTINUED | OUTPATIENT
Start: 2021-04-26 | End: 2021-04-28 | Stop reason: HOSPADM

## 2021-04-26 RX ORDER — VANCOMYCIN HYDROCHLORIDE 1 G/20ML
INJECTION, POWDER, LYOPHILIZED, FOR SOLUTION INTRAVENOUS AS NEEDED
Status: DISCONTINUED | OUTPATIENT
Start: 2021-04-26 | End: 2021-04-26 | Stop reason: HOSPADM

## 2021-04-26 RX ORDER — ALBUTEROL SULFATE 0.83 MG/ML
2.5 SOLUTION RESPIRATORY (INHALATION) AS NEEDED
Status: DISCONTINUED | OUTPATIENT
Start: 2021-04-26 | End: 2021-04-26 | Stop reason: HOSPADM

## 2021-04-26 RX ORDER — DIPHENHYDRAMINE HYDROCHLORIDE 50 MG/ML
12.5 INJECTION, SOLUTION INTRAMUSCULAR; INTRAVENOUS
Status: DISCONTINUED | OUTPATIENT
Start: 2021-04-26 | End: 2021-04-28 | Stop reason: HOSPADM

## 2021-04-26 RX ORDER — CHOLECALCIFEROL TAB 125 MCG (5000 UNIT) 125 MCG
5000 TAB ORAL DAILY
Status: DISCONTINUED | OUTPATIENT
Start: 2021-04-27 | End: 2021-04-28 | Stop reason: HOSPADM

## 2021-04-26 RX ORDER — GLYCOPYRROLATE 0.2 MG/ML
INJECTION INTRAMUSCULAR; INTRAVENOUS AS NEEDED
Status: DISCONTINUED | OUTPATIENT
Start: 2021-04-26 | End: 2021-04-26 | Stop reason: HOSPADM

## 2021-04-26 RX ORDER — POVIDONE-IODINE 10 %
SOLUTION, NON-ORAL TOPICAL AS NEEDED
Status: DISCONTINUED | OUTPATIENT
Start: 2021-04-26 | End: 2021-04-26 | Stop reason: HOSPADM

## 2021-04-26 RX ORDER — ONDANSETRON 2 MG/ML
4 INJECTION INTRAMUSCULAR; INTRAVENOUS
Status: ACTIVE | OUTPATIENT
Start: 2021-04-26 | End: 2021-04-27

## 2021-04-26 RX ORDER — GABAPENTIN 300 MG/1
300 CAPSULE ORAL 3 TIMES DAILY
Status: DISCONTINUED | OUTPATIENT
Start: 2021-04-26 | End: 2021-04-28 | Stop reason: HOSPADM

## 2021-04-26 RX ADMIN — FAMOTIDINE 20 MG: 20 TABLET, FILM COATED ORAL at 17:11

## 2021-04-26 RX ADMIN — CEFAZOLIN SODIUM 2 G: 1 POWDER, FOR SOLUTION INTRAMUSCULAR; INTRAVENOUS at 11:28

## 2021-04-26 RX ADMIN — PROPOFOL 100 MCG/KG/MIN: 10 INJECTION, EMULSION INTRAVENOUS at 11:42

## 2021-04-26 RX ADMIN — CYCLOBENZAPRINE 10 MG: 10 TABLET, FILM COATED ORAL at 22:05

## 2021-04-26 RX ADMIN — GABAPENTIN 300 MG: 300 CAPSULE ORAL at 22:05

## 2021-04-26 RX ADMIN — FENTANYL CITRATE 100 MCG: 50 INJECTION, SOLUTION INTRAMUSCULAR; INTRAVENOUS at 11:17

## 2021-04-26 RX ADMIN — SODIUM CHLORIDE 125 ML/HR: 9 INJECTION, SOLUTION INTRAVENOUS at 23:48

## 2021-04-26 RX ADMIN — ROCURONIUM BROMIDE 30 MG: 10 INJECTION INTRAVENOUS at 11:35

## 2021-04-26 RX ADMIN — FENTANYL CITRATE 100 MCG: 50 INJECTION, SOLUTION INTRAMUSCULAR; INTRAVENOUS at 11:46

## 2021-04-26 RX ADMIN — HYDROMORPHONE HYDROCHLORIDE 0.5 MG: 2 INJECTION INTRAMUSCULAR; INTRAVENOUS; SUBCUTANEOUS at 12:29

## 2021-04-26 RX ADMIN — DEXAMETHASONE SODIUM PHOSPHATE 4 MG: 4 INJECTION, SOLUTION INTRAMUSCULAR; INTRAVENOUS at 13:12

## 2021-04-26 RX ADMIN — Medication 3 MG: at 13:35

## 2021-04-26 RX ADMIN — DOCUSATE SODIUM 50MG AND SENNOSIDES 8.6MG 1 TABLET: 8.6; 5 TABLET, FILM COATED ORAL at 17:11

## 2021-04-26 RX ADMIN — CYCLOBENZAPRINE 10 MG: 10 TABLET, FILM COATED ORAL at 17:22

## 2021-04-26 RX ADMIN — CEFAZOLIN 2 G: 1 INJECTION, POWDER, FOR SOLUTION INTRAMUSCULAR; INTRAVENOUS at 17:11

## 2021-04-26 RX ADMIN — HYDROMORPHONE HYDROCHLORIDE 0.5 MG: 2 INJECTION INTRAMUSCULAR; INTRAVENOUS; SUBCUTANEOUS at 13:34

## 2021-04-26 RX ADMIN — GLYCOPYRROLATE 0.4 MG: 0.2 INJECTION INTRAMUSCULAR; INTRAVENOUS at 13:35

## 2021-04-26 RX ADMIN — ROCURONIUM BROMIDE 10 MG: 10 INJECTION INTRAVENOUS at 11:19

## 2021-04-26 RX ADMIN — FENTANYL CITRATE 50 MCG: 50 INJECTION, SOLUTION INTRAMUSCULAR; INTRAVENOUS at 12:03

## 2021-04-26 RX ADMIN — HYDROMORPHONE HYDROCHLORIDE 0.5 MG: 1 INJECTION, SOLUTION INTRAMUSCULAR; INTRAVENOUS; SUBCUTANEOUS at 15:25

## 2021-04-26 RX ADMIN — HYDROMORPHONE HYDROCHLORIDE 1 MG: 2 INJECTION INTRAMUSCULAR; INTRAVENOUS; SUBCUTANEOUS at 12:48

## 2021-04-26 RX ADMIN — SODIUM CHLORIDE, POTASSIUM CHLORIDE, SODIUM LACTATE AND CALCIUM CHLORIDE 150 ML/HR: 600; 310; 30; 20 INJECTION, SOLUTION INTRAVENOUS at 08:13

## 2021-04-26 RX ADMIN — Medication: at 15:36

## 2021-04-26 RX ADMIN — OXYCODONE 10 MG: 5 TABLET ORAL at 17:32

## 2021-04-26 RX ADMIN — SUCCINYLCHOLINE CHLORIDE 100 MG: 20 INJECTION, SOLUTION INTRAMUSCULAR; INTRAVENOUS at 11:20

## 2021-04-26 RX ADMIN — ONDANSETRON HYDROCHLORIDE 4 MG: 2 SOLUTION INTRAMUSCULAR; INTRAVENOUS at 13:34

## 2021-04-26 RX ADMIN — HYDROMORPHONE HYDROCHLORIDE 0.5 MG: 1 INJECTION, SOLUTION INTRAMUSCULAR; INTRAVENOUS; SUBCUTANEOUS at 15:12

## 2021-04-26 RX ADMIN — SODIUM CHLORIDE 125 ML/HR: 9 INJECTION, SOLUTION INTRAVENOUS at 15:45

## 2021-04-26 RX ADMIN — GABAPENTIN 300 MG: 300 CAPSULE ORAL at 17:11

## 2021-04-26 RX ADMIN — LIDOCAINE HYDROCHLORIDE 80 MG: 20 INJECTION, SOLUTION EPIDURAL; INFILTRATION; INTRACAUDAL; PERINEURAL at 11:19

## 2021-04-26 RX ADMIN — HYDROMORPHONE HYDROCHLORIDE 0.5 MG: 1 INJECTION, SOLUTION INTRAMUSCULAR; INTRAVENOUS; SUBCUTANEOUS at 18:40

## 2021-04-26 RX ADMIN — PROPOFOL 30 MG: 10 INJECTION, EMULSION INTRAVENOUS at 11:30

## 2021-04-26 RX ADMIN — MIDAZOLAM HYDROCHLORIDE 4 MG: 2 INJECTION, SOLUTION INTRAMUSCULAR; INTRAVENOUS at 11:12

## 2021-04-26 RX ADMIN — PROPOFOL 170 MG: 10 INJECTION, EMULSION INTRAVENOUS at 11:19

## 2021-04-26 RX ADMIN — HYDROMORPHONE HYDROCHLORIDE 0.5 MG: 1 INJECTION, SOLUTION INTRAMUSCULAR; INTRAVENOUS; SUBCUTANEOUS at 15:55

## 2021-04-26 RX ADMIN — DEXAMETHASONE SODIUM PHOSPHATE 4 MG: 4 INJECTION, SOLUTION INTRAMUSCULAR; INTRAVENOUS at 11:30

## 2021-04-26 RX ADMIN — OXYCODONE 5 MG: 5 TABLET ORAL at 22:05

## 2021-04-26 NOTE — H&P
Date of Surgery Update:  Blanca Hilliard was seen and examined. History and physical has been reviewed. The patient has been examined.  There have been no significant clinical changes since the completion of the originally dated History and Physical.    Signed By: Joy Hendrickson MD     April 26, 2021 9:45 AM

## 2021-04-26 NOTE — PERIOP NOTES
TRANSFER - OUT REPORT:    Verbal report given to ELADIA Roque(name) on Donald Davis  being transferred to   for routine post - op       Report consisted of patients Situation, Background, Assessment and   Recommendations(SBAR). Information from the following report(s) SBAR, OR Summary, Procedure Summary, Intake/Output, Med Rec Status and Cardiac Rhythm st. was reviewed with the receiving nurse. Lines:   Peripheral IV 04/26/21 Left Hand (Active)   Site Assessment Clean, dry, & intact 04/26/21 1540   Phlebitis Assessment 0 04/26/21 1540   Infiltration Assessment 0 04/26/21 1540   Dressing Status Clean, dry, & intact; Occlusive 04/26/21 1540   Dressing Type Tape;Transparent 04/26/21 1540   Hub Color/Line Status Pink; Infusing 04/26/21 1540   Alcohol Cap Used Yes 04/26/21 6570        Opportunity for questions and clarification was provided.       Patient transported with:   O2 @ 2 liters  Registered Nurse

## 2021-04-26 NOTE — ADDENDUM NOTE
Addendum  created 04/26/21 1807 by Jovanny Johnson MD    Attestation recorded in [address] [facility], [place], [employer] 97 filed

## 2021-04-26 NOTE — OP NOTES
[phone], [address] [facility]    OPERATIVE REPORT      NAME: Kadeem Dodd    AGE: 37 y.o. YOB: 1985    MEDICAL RECORD NUMBER: [id number]    DATE OF SURGERY: 4/26/2021    PREOPERATIVE DIAGNOSES:  1. Lumbar stenosis  2. Acquired lumbar spondylolisthesis  3. Prior fusion L5-S1    POSTOPERATIVE DIAGNOSES:  1. Lumbar stenosis   2. Acquired lumbar spondylolisthesis   3. Prior fusion L5-S1 pseudarthrosis    OPERATIVE PROCEDURES:   1. Laminectomy, partial facetectomy, and foraminotomy of L4.   2. Laminectomy, partial facetectomy, and foraminotomy of L5.   3. Posterolateral fusion and posterior lumbar interbody fusion of L4-L5. 4. Posterolateral fusion of L5-S1.   5. Pedicle screw instrumentation with RTI pedicle screws for L4, L5, and S1.   6. Application of RTI biomechanical interbody device at L4-L5.  7. Morselized allograft for spine surgery and local autograft for spine surgery with stem cells. SURGEON: Reji Simon MD     ASSISTANT: FRACISCO Dickerson    ANESTHESIA: General    COMPLICATIONS: None apparent     NEUROMONITORING: We used SSEPs and spontaneous EMGs. We also stimulated the pedicle screws. ESTIMATED BLOOD LOSS: 200 cc    Specimens - none    INDICATION FOR PROCEDURE: The patient is a very pleasant 37 y.o. female with debilitating back pain and leg pain. She failed conservative measures. She elected to proceed with operative intervention. She was aware of the risks, benefits, and alternatives. She provided informed consent. PROCEDURE: The patient was identified in the preoperative holding area. Her lumbar spine was marked by me. She was transferred to the operating room where general anesthesia was given. She was also given perioperative ancef antibiotics. She was placed prone on the [facility] table. All bony prominences were well padded. The lumbar spine was prepped and draped in the usual standard fashion.  We performed a surgical timeout. I made a skin incision in the midline. I exposed the posterior lumbar spine. I took intraoperative fluoroscopy to verify our levels. I exposed the transverse processes of L4, L5, and S1 bilaterally. I removed the prior instrumentation from L5-S1. The right S1 screw was broken right below the tulip head. I then began my decompression by performing a laminectomy of L4 and L5. I also performed a partial facetectomy and foraminotomy to decompress the thecal sac and the roots of L4 and L5 bilaterally. I performed a transforaminal approach to the right L4-L5 disk space. I then performed a standard diskectomy at L4-L5. I prepared the endplates to bleeding bone. I performed trial sizing. I placed the appropriately sized biomechanical device iinto L4-L5 with the appropriate amount of tension and alignment. The biomechanical device was packed with stem cells and morselized allograft. I then cannulated our pedicles for L4, L5, and S1 bilaterally in standard fashion, except for the right S1 pedicle that had the broken screw within it. I probed each pedicle. I copiously irrigated the entire wound. I decorticated our fusion beds bilaterally with a high speed bur. I placed our bone graft into our fusion beds bilaterally. I then placed pedicle screws for L4, L5, and S1 bilaterally (except for the right S1 pedicle) in standard fashion under fluoroscopic guidance. . I had good purchase for each pedicle screw. I stimulated all the screws with pedicle screw stimulation. The pedicle screws were found to be within the appropriate range of amplitude. I then placed contoured rods on top of the pedicle screws bilaterally. The rods were locked to the screws according to the 's specification. We had good hemostasis. There was no CSF leaking. The fusion beds were packed with morselized allograft and local autograft. The exposed neurologic elements were protected with Duragen.  I injected the soft tissues with a pain management cocktail. A deep drain was placed. The wound was closed in several layers. A sterile dressing was applied. The patient was extubated and transferred to the recovery room in good medical condition. The PA assisted with retraction and closure. I, Dr. Lolis Valdez, performed the above procedure.      Lolis Valdez MD  4/26/2021

## 2021-04-26 NOTE — ANESTHESIA POSTPROCEDURE EVALUATION
Procedure(s):  REVISION L4-L5  LAMINECTOMY, L4-S1 FUSION, INSTRUMENTATION, TLIF, BONE GRAFT, REMOVAL OF HARDWARE (LATEX ALLERGY). general    Anesthesia Post Evaluation        Patient location during evaluation: PACU  Patient participation: complete - patient participated  Level of consciousness: awake and alert  Pain score: 2  Pain management: satisfactory to patient  Airway patency: patent  Anesthetic complications: no  Cardiovascular status: acceptable  Respiratory status: acceptable  Hydration status: acceptable  Post anesthesia nausea and vomiting:  none  Final Post Anesthesia Temperature Assessment:  Normothermia (36.0-37.5 degrees C)      INITIAL Post-op Vital signs:   Vitals Value Taken Time   /91 04/26/21 1520   Temp 36.8 °C (98.2 °F) 04/26/21 1457   Pulse 112 04/26/21 1524   Resp 10 04/26/21 1524   SpO2 99 % 04/26/21 1524   Vitals shown include unvalidated device data.

## 2021-04-26 NOTE — PERIOP NOTES
Three screws,two rods and four caps explanted   One head of screw explanted and the screw shank left on right side Sacral one.

## 2021-04-26 NOTE — ANESTHESIA PREPROCEDURE EVALUATION
Relevant Problems   No relevant active problems       Anesthetic History   No history of anesthetic complications            Review of Systems / Medical History  Patient summary reviewed, nursing notes reviewed and pertinent labs reviewed    Pulmonary  Within defined limits                 Neuro/Psych   Within defined limits           Cardiovascular  Within defined limits                     GI/Hepatic/Renal  Within defined limits              Endo/Other      Hypothyroidism       Other Findings              Physical Exam    Airway  Mallampati: II  TM Distance: 4 - 6 cm  Neck ROM: normal range of motion   Mouth opening: Normal     Cardiovascular    Rhythm: regular  Rate: normal         Dental  No notable dental hx       Pulmonary  Breath sounds clear to auscultation               Abdominal         Other Findings            Anesthetic Plan    ASA: 2  Anesthesia type: general            Anesthetic plan and risks discussed with: Patient

## 2021-04-27 LAB
ANION GAP SERPL CALC-SCNC: 6 MMOL/L (ref 5–15)
BUN SERPL-MCNC: 6 MG/DL (ref 6–20)
BUN/CREAT SERPL: 8 (ref 12–20)
CALCIUM SERPL-MCNC: 8.5 MG/DL (ref 8.5–10.1)
CHLORIDE SERPL-SCNC: 106 MMOL/L (ref 97–108)
CO2 SERPL-SCNC: 26 MMOL/L (ref 21–32)
CREAT SERPL-MCNC: 0.74 MG/DL (ref 0.55–1.02)
GLUCOSE SERPL-MCNC: 152 MG/DL (ref 65–100)
HGB BLD-MCNC: 11.4 G/DL (ref 11.5–16)
POTASSIUM SERPL-SCNC: 3.9 MMOL/L (ref 3.5–5.1)
SODIUM SERPL-SCNC: 138 MMOL/L (ref 136–145)

## 2021-04-27 PROCEDURE — 94760 N-INVAS EAR/PLS OXIMETRY 1: CPT

## 2021-04-27 PROCEDURE — 97530 THERAPEUTIC ACTIVITIES: CPT

## 2021-04-27 PROCEDURE — 97116 GAIT TRAINING THERAPY: CPT

## 2021-04-27 PROCEDURE — 2709999900 HC NON-CHARGEABLE SUPPLY

## 2021-04-27 PROCEDURE — 80048 BASIC METABOLIC PNL TOTAL CA: CPT

## 2021-04-27 PROCEDURE — 65270000029 HC RM PRIVATE

## 2021-04-27 PROCEDURE — 74011000250 HC RX REV CODE- 250: Performed by: ORTHOPAEDIC SURGERY

## 2021-04-27 PROCEDURE — 85018 HEMOGLOBIN: CPT

## 2021-04-27 PROCEDURE — 97535 SELF CARE MNGMENT TRAINING: CPT

## 2021-04-27 PROCEDURE — 36415 COLL VENOUS BLD VENIPUNCTURE: CPT

## 2021-04-27 PROCEDURE — 74011250637 HC RX REV CODE- 250/637: Performed by: ORTHOPAEDIC SURGERY

## 2021-04-27 PROCEDURE — 97161 PT EVAL LOW COMPLEX 20 MIN: CPT

## 2021-04-27 PROCEDURE — 97165 OT EVAL LOW COMPLEX 30 MIN: CPT

## 2021-04-27 PROCEDURE — 74011250636 HC RX REV CODE- 250/636: Performed by: ORTHOPAEDIC SURGERY

## 2021-04-27 RX ADMIN — GABAPENTIN 300 MG: 300 CAPSULE ORAL at 08:06

## 2021-04-27 RX ADMIN — OXYCODONE 5 MG: 5 TABLET ORAL at 06:47

## 2021-04-27 RX ADMIN — DOCUSATE SODIUM 50MG AND SENNOSIDES 8.6MG 1 TABLET: 8.6; 5 TABLET, FILM COATED ORAL at 17:08

## 2021-04-27 RX ADMIN — POLYETHYLENE GLYCOL 3350 17 G: 17 POWDER, FOR SOLUTION ORAL at 08:06

## 2021-04-27 RX ADMIN — LEVOTHYROXINE SODIUM 150 MCG: 0.15 TABLET ORAL at 06:47

## 2021-04-27 RX ADMIN — Medication 10 ML: at 08:06

## 2021-04-27 RX ADMIN — OXYCODONE 10 MG: 5 TABLET ORAL at 14:06

## 2021-04-27 RX ADMIN — OXYCODONE 10 MG: 5 TABLET ORAL at 23:49

## 2021-04-27 RX ADMIN — OXYCODONE 10 MG: 5 TABLET ORAL at 20:36

## 2021-04-27 RX ADMIN — OXYCODONE 5 MG: 5 TABLET ORAL at 03:34

## 2021-04-27 RX ADMIN — Medication 10 ML: at 14:49

## 2021-04-27 RX ADMIN — GABAPENTIN 300 MG: 300 CAPSULE ORAL at 20:36

## 2021-04-27 RX ADMIN — LINACLOTIDE 145 MCG: 145 CAPSULE, GELATIN COATED ORAL at 09:59

## 2021-04-27 RX ADMIN — OXYCODONE 10 MG: 5 TABLET ORAL at 09:59

## 2021-04-27 RX ADMIN — Medication 10 ML: at 20:37

## 2021-04-27 RX ADMIN — DOCUSATE SODIUM 50MG AND SENNOSIDES 8.6MG 1 TABLET: 8.6; 5 TABLET, FILM COATED ORAL at 08:06

## 2021-04-27 RX ADMIN — OXYCODONE 10 MG: 5 TABLET ORAL at 17:08

## 2021-04-27 RX ADMIN — CHOLECALCIFEROL TAB 125 MCG (5000 UNIT) 5000 UNITS: 125 TAB at 08:06

## 2021-04-27 RX ADMIN — DULOXETINE HYDROCHLORIDE 60 MG: 30 CAPSULE, DELAYED RELEASE ORAL at 08:06

## 2021-04-27 RX ADMIN — SODIUM CHLORIDE 125 ML/HR: 9 INJECTION, SOLUTION INTRAVENOUS at 08:06

## 2021-04-27 RX ADMIN — CEFAZOLIN 2 G: 1 INJECTION, POWDER, FOR SOLUTION INTRAMUSCULAR; INTRAVENOUS at 01:44

## 2021-04-27 RX ADMIN — GABAPENTIN 300 MG: 300 CAPSULE ORAL at 17:08

## 2021-04-27 RX ADMIN — HYDROMORPHONE HYDROCHLORIDE 0.5 MG: 1 INJECTION, SOLUTION INTRAMUSCULAR; INTRAVENOUS; SUBCUTANEOUS at 08:04

## 2021-04-27 RX ADMIN — HYDROMORPHONE HYDROCHLORIDE 0.5 MG: 1 INJECTION, SOLUTION INTRAMUSCULAR; INTRAVENOUS; SUBCUTANEOUS at 14:49

## 2021-04-27 RX ADMIN — CYCLOBENZAPRINE 10 MG: 10 TABLET, FILM COATED ORAL at 03:34

## 2021-04-27 RX ADMIN — CEFAZOLIN 2 G: 1 INJECTION, POWDER, FOR SOLUTION INTRAMUSCULAR; INTRAVENOUS at 09:59

## 2021-04-27 RX ADMIN — HYDROMORPHONE HYDROCHLORIDE 0.5 MG: 1 INJECTION, SOLUTION INTRAMUSCULAR; INTRAVENOUS; SUBCUTANEOUS at 11:11

## 2021-04-27 RX ADMIN — CYCLOBENZAPRINE 10 MG: 10 TABLET, FILM COATED ORAL at 20:36

## 2021-04-27 RX ADMIN — FAMOTIDINE 20 MG: 20 TABLET, FILM COATED ORAL at 17:08

## 2021-04-27 RX ADMIN — FAMOTIDINE 20 MG: 20 TABLET, FILM COATED ORAL at 08:06
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
## 2021-04-27 NOTE — FACE TO FACE
Rounded on patient, f/u from Spine Pre-op Patient Education Class. Patient states she was not able to view spine class. Patient states their home space is prepared and safe. Reviewed incentive spirometry use and mobility precautions. Questions answered.

## 2021-04-27 NOTE — PROGRESS NOTES
Problem: Mobility Impaired (Adult and Pediatric)  Goal: *Acute Goals and Plan of Care (Insert Text)  Description: FUNCTIONAL STATUS PRIOR TO ADMISSION: The patient was functional at the wheelchair level and was modified independent for transfers to the chair; used for long distances. HOME SUPPORT PRIOR TO ADMISSION: The patient lived with 3 roommates; state boyfriend would assist with some ADL's as needed. Physical Therapy Goals  Initiated 4/27/2021    1. Patient will move from supine to sit and sit to supine  in bed with independence within 4 days. 2. Patient will perform sit to stand with modified independence within 4 days. 3. Patient will ambulate with modified independence for 150 feet with the least restrictive device within 4 days. 4. Patient will ascend/descend 4 stairs with 1 handrail(s) with supervision/set-up within 4 days. 5. Patient will verbalize and demonstrate understanding of spinal precautions (No bending, lifting greater than 5 lbs, or twisting; log-roll technique; frequent repositioning as instructed) within 4 days.        4/27/2021 1449 by Golden Red, PT  Outcome: Progressing Towards Goal  4/27/2021 1004 by Golden Red, PT  Outcome: Progressing Towards Goal   PHYSICAL THERAPY TREATMENT  Patient: Paulette Rouse (50 y.o. female)  Date: 4/27/2021  Diagnosis: Lumbar radiculitis [M54.16]  Spinal stenosis of lumbar region with neurogenic claudication [M48.062]  Spondylolisthesis, lumbar region [M43.16]  Lumbar stenosis with neurogenic claudication [M48.062] <principal problem not specified>  Procedure(s) (LRB):  REVISION L4-L5  LAMINECTOMY, L4-S1 FUSION, INSTRUMENTATION, TLIF, BONE GRAFT, REMOVAL OF HARDWARE (LATEX ALLERGY) (N/A) 1 Day Post-Op  Precautions: Fall, Back(no bending, lifting greater than 5 pounds, no twisting; log roll technique to get out of be; brace on when ambulating and sitting in chair) No bending, no lifting greater than 5 lbs, no twisting, log-roll technique, repositioning every 20-30 min except when sleeping, brace when OOB (if ordered)  Chart, physical therapy assessment, plan of care and goals were reviewed. ASSESSMENT  Patient continues with skilled PT services and is progressing towards goals. Patient states she has rolling walker at home. She will benefit from HHPT upon discharge. Still has hemovac. Will likely clear PT tomorrow. Current Level of Function Impacting Discharge (mobility/balance): Received in bed ready for PT. Log rolled and sat on edge of bed with supervision. Dons brace independently once placed. Patient ambulated to restroom, then in hallway about 60 feet total with rolling walker and SBA. Returned to bed at her request.      Other factors to consider for discharge: none         PLAN :  Patient continues to benefit from skilled intervention to address the above impairments. Continue treatment per established plan of care. to address goals. Recommendation for discharge: (in order for the patient to meet his/her long term goals)  Physical therapy at least 2 days/week in the home     This discharge recommendation:  Has been made in collaboration with the attending provider and/or case management    IF patient discharges home will need the following DME: patient owns DME required for discharge       SUBJECTIVE:   Patient stated Rachel Lombardi will get up later in the chair for supper.     OBJECTIVE DATA SUMMARY:   Critical Behavior:  Neurologic State: Alert, Appropriate for age  Orientation Level: Oriented X4  Cognition: Appropriate decision making, Appropriate for age attention/concentration, Appropriate safety awareness, Follows commands  Safety/Judgement: Good awareness of safety precautions, Insight into deficits    Spinal diagnosis intervention:  The patient stated 3/3 back precautions when prompted. Reviewed all 3 back precautions, log roll technique, and sitting for 30 minutes at a time.     Functional Mobility Training:    Bed Mobility:  Log Rolling: Supervision  Supine to Sit: Modified independent     Scooting: Supervision        Transfers:  Sit to Stand: Stand-by assistance  Stand to Sit: Stand-by assistance                             Balance:  Sitting: Intact  Standing: Intact; With support  Ambulation/Gait Training:  Distance (ft): 60 Feet (ft)  Assistive Device: Gait belt;Brace/Splint; Walker, rolling  Ambulation - Level of Assistance: Stand-by assistance        Gait Abnormalities: Decreased step clearance              Speed/Halley: Pace decreased (<100 feet/min)                               Pain Ratin/10    Activity Tolerance:   Good    After treatment patient left in no apparent distress:   Supine in bed, Call bell within reach, Bed / chair alarm activated, and Side rails x 3    COMMUNICATION/COLLABORATION:   The patients plan of care was discussed with: Registered nurse.      Merrick Dockery, PT   Time Calculation: 25 mins

## 2021-04-27 NOTE — PROGRESS NOTES
Problem: Falls - Risk of  Goal: *Absence of Falls  Description: Document Chetna Salgado Fall Risk and appropriate interventions in the flowsheet.   Outcome: Progressing Towards Goal  Note: Fall Risk Interventions:  Mobility Interventions: Communicate number of staff needed for ambulation/transfer, OT consult for ADLs, Patient to call before getting OOB, PT Consult for mobility concerns, PT Consult for assist device competence, Utilize walker, cane, or other assistive device         Medication Interventions: Patient to call before getting OOB, Teach patient to arise slowly    Elimination Interventions: Call light in reach, Patient to call for help with toileting needs    History of Falls Interventions: Door open when patient unattended

## 2021-04-27 NOTE — PROGRESS NOTES
4/27/2021     11:02 AM  CM received acceptance from [facility] for [facility] services. 10:02 AM  Reason for Admission: Elective - Lumbar radiculitis. Surgery required      Assessment:   [x]In person with pt   []Via p/c with pt   []With family member in person. Who/Relation:     []With family member via p/c. Who/Relation:   []Chart Review    RUR:  NA - OBS  Risk Level: [x]Low []Moderate []High  Value-based purchasing: [] Yes [x] No  Bundle patient: [] Yes [x] No   Specify:     Advance Directive: No Order. Full Code. [x] No AD on file. [] AD on file. [] Current AD not on file. Copy requested. [] Requests AD, and referral submitted to [facility]. Healthcare Decision Maker:  Francine oh        Assessment:    Age:  37    Sex: [] Male [x]Female     Residency: [x]Private residence []Apartment []Assisted Living []LTC []Other:   Exterior Steps: 2  Interior Steps: 0    Lives With: []With spouse []Other family members []Underage children []Alone []Care provider [x]Other: Pt has 3 roommates who will be able to assist pt during recovery. Prior functioning:  [x]Independent with ADLs and iADLS []Dependent with ADLs and iADLs []Partial dependence, Specify:     Prior DME required:  []None []RW []Cane []Crutches []Bedside commode []CPAP []Home O2 (Liter/Provider: ) []Nebulizer   [x]Shower Chair [x]Wheelchair []Hospital Bed []Isaac []Stair lift []Rollator []Other:    DME available: []None []RW []Cane []Crutches []Bedside commode []CPAP []Home O2 (Liter/Provider: ) []Nebulizer   [x]Shower Chair [x]Wheelchair []Hospital Bed []Isaac []Stair lift []Rollator []Other:    Rehab history: [x]None []Outpatient PT []Home Health (Provider/Date: ) []SNF (Provider/Date: ) []IPR (Provider/Date: ) []LTC (Provider/Date: ) []Hospice (Provider/Date: )  []Other:     Discharge Concerns: []Yes [x]No []Unknown   Describe:    Comments:      Insurer:   Ken Adorno STATE Phone:     Subscriber: Bre Albarran Subscriber#: [id number]    Group#: [id number] Precert#:           PCP: Kena Alvares   Address: [place] [address]   Phone number: [phone]   Current patient: [x]Yes []No   Approximate date of last visit: 02/18/2021   Access to virtual PCP visits: [x]Yes []No    Pharmacy:  [address] Transport: Family       Transition of care plan:    []Unable to determine at this time. Awaiting clinical progress, and disposition recommendations. [] Home with outpatient follow-up    [] Home with Outpatient PT and outpatient follow-up   Pt aware of OP appt? []Yes, Provider:   []Not scheduled   Transport provider:     [] Home with family assistance as needed and outpatient follow-up   Family able to assist:    Schedule:  Transport provider:      [x] Home with Home Health   - Provider: CM consult for [place] noted. CM completed referral to pt preference, [facility], via [employer], and is awaiting response. []SNF/IPR   -[]Preferences given:   []Listing provided and preferences requested   -Status: []Pending []Accepted:    -Auth required: []Yes []No    -Auth initiated date:   -3 midnight stay required: []Yes []No  Date satisfied:     [] Home with Hospice   -Provider:     [] [facility] information provided. [] Other:     Amarilys Nagy MA    Care Management Interventions  PCP Verified by CM: Yes([place])  Mode of Transport at Discharge:  Other (see comment)(Family)  Transition of Care Consult (CM Consult): Discharge Planning, [address]: No  Reason Outside [place]: Physician referred to specific agency, Patient already serviced by other home care/hospice agency  MyChart Signup: No  Discharge Durable Medical Equipment: No  Physical Therapy Consult: Yes  Occupational Therapy Consult: Yes  Speech Therapy Consult: No  Current Support Network: Lives with Spouse  Confirm Follow Up Transport: Family  The Plan for Transition of Care is Related to the Following Treatment Goals : Lumbar radiculitis  The Patient and/or Patient Representative was Provided with a Choice of Provider and Agrees with the Discharge Plan?: (S) Yes  Name of the Patient Representative Who was Provided with a Choice of Provider and Agrees with the Discharge Plan: Self  Freedom of Choice List was Provided with Basic Dialogue that Supports the Patient's Individualized Plan of Care/Goals, Treatment Preferences and Shares the Quality Data Associated with the Providers?: (S) Yes  Discharge Location  Discharge Placement: Home with home health

## 2021-04-27 NOTE — PROGRESS NOTES
Problem: Self Care Deficits Care Plan (Adult)  Goal: *Acute Goals and Plan of Care (Insert Text)  Description: FUNCTIONAL STATUS PRIOR TO ADMISSION: The patient was functional at the wheelchair level and was modified independent for transfers to the chair; used for long distances. HOME SUPPORT PRIOR TO ADMISSION: The patient lived with 3 roommates; states boyfriend would assist with some ADL's as needed. Occupational Therapy Goals  Initiated 4/27/2021    1. Patient will perform lower body dressing with modified independence using AE PRN within 7 days. 2.  Patient will perform toileting and toilet transfer with modified independence using most appropriate DME within 7 days. 3.  Patient will perform grooming, standing at sink, at modified independence within 7 days. 4.  Patient will don/doff back brace at modified independence within 7 days. 5.  Patient will verbalize/demonstrate 3/3 back precautions during ADL tasks without cues within 7 days. Outcome: Progressing Towards Goal     OCCUPATIONAL THERAPY EVALUATION  Patient: Shiva Donato (20 y.o. female)  Date: 4/27/2021  Primary Diagnosis: Lumbar radiculitis [M54.16]  Spinal stenosis of lumbar region with neurogenic claudication [M48.062]  Spondylolisthesis, lumbar region [M43.16]  Lumbar stenosis with neurogenic claudication [M48.062]  Procedure(s) (LRB):  REVISION L4-L5  LAMINECTOMY, L4-S1 FUSION, INSTRUMENTATION, TLIF, BONE GRAFT, REMOVAL OF HARDWARE (LATEX ALLERGY) (N/A) 1 Day Post-Op   Precautions:  Fall, Back(no bending, lifting greater than 5 pounds, no twisting; log roll technique to get out of be; brace on when ambulating and sitting in chair)    ASSESSMENT  Based on the objective data described below, the patient presents with decreased activity tolerance, minimal pain, decreased functional reach to distal LEs, and new back precautions impacting ADL performance and mobility following admission.  Pt is POD 1 s/p revision L4-5 laminectomy and L4-S1 fusion, cleared to participate. She is receptive to all education regarding back precautions, ADL adaptations, and activity pacing. She requires min A to don LSO brace at EOB with cues for fit and orientation and is able to perform mobility to bathroom with use of rolling walker for pain control. She is able to manage hygiene with SBA and is instructed on use of AE to increase reach. Pt unable to fully tailor sit this session and may benefit from [facility] AE training pending progress with tailor sit in acute setting. Anticipate good progress in acute setting with continued skilled OT services to maximize highest level of function prior to d/c home. Current Level of Function Impacting Discharge (ADLs/self-care): up to min/mod A for LSO mgmt; mod A for LB dressing (without AE); CGA for toileting; set up for seated grooming (infer up to CGA for standing grooming); CGA for ADL transfers    Functional Outcome Measure: The patient scored 70/100 on the Barthel outcome measure which is indicative of minimal functional impairment. Other factors to consider for discharge: has support of friends/boyfriend; back precautions; age     Patient will benefit from skilled therapy intervention to address the above noted impairments. PLAN :  Recommendations and Planned Interventions: self care training, functional mobility training, therapeutic exercise, balance training, therapeutic activities, endurance activities, patient education, home safety training and family training/education    Frequency/Duration: Patient will be followed by occupational therapy 5 times a week to address goals.     Recommendation for discharge: (in order for the patient to meet his/her long term goals)  Occupational therapy at least 2 days/week in the home vs. none    This discharge recommendation:  A follow-up discussion with the attending provider and/or case management is planned    IF patient discharges home will need the following DME: patient owns DME required for discharge       SUBJECTIVE:   Patient stated It's amazing how much better it feels already.     OBJECTIVE DATA SUMMARY:   HISTORY:   Past Medical History:   Diagnosis Date    Anxiety and depression     IBS (irritable bowel syndrome)     Low vitamin D level     Poor dentition     Thyroid cancer ([facility] [address]      Past Surgical History:   Procedure Laterality Date    HX LUMBAR LAMINECTOMY  09/2008    HX THYROIDECTOMY      HX TUBAL LIGATION         Expanded or extensive additional review of patient history:     Home Situation  Home Environment: Private residence  # Steps to Enter: 2  Rails to Enter: No  One/Two Story Residence: One story  Living Alone: No(has 3 roomates)  Support Systems: Friends \ neighbors  Patient Expects to be Discharged to[de-identified] Private residence  Current DME Used/Available at Home: [address], [place], straight, Walker, Commode, bedside, Crutches, Shower chair, Wheelchair(reacher)  Tub or Shower Type: Tub/Shower combination    Hand dominance: Right    EXAMINATION OF PERFORMANCE DEFICITS:  Cognitive/Behavioral Status:  Neurologic State: Alert; Appropriate for age  Orientation Level: Oriented X4  Cognition: Appropriate decision making; Appropriate for age attention/concentration; Appropriate safety awareness; Follows commands  Perception: Appears intact  Perseveration: No perseveration noted  Safety/Judgement: Good awareness of safety precautions; Insight into deficits    Hearing: Auditory  Auditory Impairment: None    Vision/Perceptual:    Tracking: Able to track stimulus in all quadrants w/o difficulty    Diplopia: No      Range of Motion:  AROM: Within functional limits    Strength:  Strength: Within functional limits    Coordination:  Fine Motor Skills-Upper: Left Intact; Right Intact    Gross Motor Skills-Upper: Left Intact; Right Intact    Tone & Sensation:  Tone: Normal  Sensation: Intact(reports some numbness to RLE which was baseline prior to surgery)    Balance:  Sitting: Intact  Standing: Intact; With support    Functional Mobility and Transfers for ADLs:  Bed Mobility:  Rolling: Stand-by assistance  Supine to Sit: Stand-by assistance  Scooting: Stand-by assistance    Transfers:  Sit to Stand: Contact guard assistance  Stand to Sit: Contact guard assistance  Bathroom Mobility: Contact guard assistance  Toilet Transfer : Contact guard assistance    ADL Assessment:  Feeding: Independent    Oral Facial Hygiene/Grooming: Setup;Contact guard assistance(set up seated; CGA standing at sink)    Bathing: Minimum assistance    Upper Body Dressing: Setup;Minimum assistance(including min/mod A for LSO mgmt)    Lower Body Dressing: Moderate assistance(for distal LE mgmt; unable to tailor sit)    Toileting: Stand by assistance;Contact guard assistance    ADL Intervention and task modifications:  Patient instructed and demonstrated 3/3 back precautions with verbal cues. Upper Body Dressing Assistance  Orthotics(Brace): Minimum assistance; Moderate assistance(for LSO brace; A to orient around back; cues for fit)  Cues: Physical assistance; Tactile cues provided;Verbal cues provided;Visual cues provided    Lower Body Dressing Assistance  Socks: Total assistance (dependent)  Leg Crossed Method Used: No(pt attempted; unable to fully cross legs 2/2 pain)  Position Performed: Seated edge of bed  Cues: Don;Physical assistance; Tactile cues provided;Verbal cues provided;Visual cues provided    Toileting  Toileting Assistance: Contact guard assistance  Bladder Hygiene: Supervision  Clothing Management: Contact guard assistance  Cues: Verbal cues provided  Adaptive Equipment: Grab bars; Walker    Cognitive Retraining  Safety/Judgement: Good awareness of safety precautions; Insight into deficits    Patient instructed and indicated understanding the benefits of maintaining activity tolerance, functional mobility, and independence with self care tasks during acute stay  to ensure safe return home and to baseline. Encouraged patient to increase frequency and duration OOB, not sitting longer than 30 mins without marching/walking with staff, be out of bed for all meals, perform daily ADLs (as approved by RN/MD regarding bathing etc), and performing functional mobility to/from bathroom. Patient instruction and indicated understanding on body mechanics, ergonomics and gravitational force on the spine during different body positions to plan activities in prep for return home to complete basic ADLs, instrumental ADLs and back to work safely. Dressing brace: Patient instructed and demonstrated to don/doff velcro on brace using dominant side, keeping non-dominant side intact. Patient instructed and demonstrated in meantime of being able to stand with back against wall to don/doff brace, to don/doff seated using lap and bed/chair surface to support brace while manipulating. Dressing lower body: Patient instructed to don brace first and on the benefits to remain seated to don all clothing to increase independence with precautions and pain management. Patient instructed on tailor on tailor sitting, but unable to complete due to pain. Educated on [address]. Toileting: Patient instructed on the benefits of using flushable wet wipes and toilet tongs if decreased reach or pain for yuriy care. Also, the benefits of a reacher to aid in clothing management. Patient instruction and indicated understanding to not strain i.e. holding breath to bear down during a bowel movement, lifting/activity, and sexual activity. Home safety: Patient instructed and indicated understanding on home modifications and safety [raise height of ADL objects (i.e. clothing, sink items, fridge items, items to mouth when grooming), appropriate height of chair surfaces, recliner safety, change of floor surfaces, clear pathways] to increase independence and fall prevention.     Standing: Patient instructed and indicated understanding to walk up to sink/counter top/surfaces, step into walker, square off while using objects, slide objects along surfaces, to increase adherence to back precautions and fall prevention. Patient instructed to increase amount of time standing in order to increase independence and tolerance with ADLs. During prolonged standing, can open cabinet door or place foot on stool to decrease spinal pressure/increase pain. Rolling Walker Safety: Educated patient about importance of keeping hands free at all times when using rolling walker for fall prevention. Provided information about walker bags/baskets/trays to assist with transporting items safely while in the home to prevent falls. Patient indicated understanding of same. Instructed patient on safe and appropriate hand placement during transfers (push up from sitting surface when standing up, reach back for sitting surface when sitting down, use grab bars, etc.), including never to pull up on rolling walker for fall prevention and safety. Instructed patient to push rolling walker up to surface (countertop, sink, etc.) and  it as opposed to abandoning rolling walker on the side for safety. Patient verbalized understanding of same. Functional Measure:  Barthel Index:    Bathin  Bladder: 10  Bowels: 10  Groomin  Dressin  Feeding: 10  Mobility: 10  Stairs: 5  Toilet Use: 5  Transfer (Bed to Chair and Back): 10  Total: 70/100        The Barthel ADL Index: Guidelines  1. The index should be used as a record of what a patient does, not as a record of what a patient could do. 2. The main aim is to establish degree of independence from any help, physical or verbal, however minor and for whatever reason. 3. The need for supervision renders the patient not independent. 4. A patient's performance should be established using the best available evidence.  Asking the patient, friends/relatives and nurses are the usual sources, but direct observation and common sense are also important. However direct testing is not needed. 5. Usually the patient's performance over the preceding 24-48 hours is important, but occasionally longer periods will be relevant. 6. Middle categories imply that the patient supplies over 50 per cent of the effort. 7. Use of aids to be independent is allowed. Octaviano Reyes., Barthel, D.W. (6517). Functional evaluation: the Barthel Index. [address] (14)2. JENNIFER Gray, Stephanie Kearney., David Kerns., [place], [address] (1999). Measuring the change indisability after inpatient rehabilitation; comparison of the responsiveness of the Barthel Index and Functional [place] Measure. Journal of Neurology, Neurosurgery, and Psychiatry, 66(4), 765-257. GAGANDEEP Montez.A, MYA Turk, & Erika Lemon, M.A. (2004.) Assessment of post-stroke quality of life in cost-effectiveness studies: The usefulness of the Barthel Index and the EuroQoL-5D. Quality of Life Research, 15, 966-27     Occupational Therapy Evaluation Charge Determination   History Examination Decision-Making   LOW Complexity : Brief history review  MEDIUM Complexity : 3-5 performance deficits relating to physical, cognitive , or psychosocial skils that result in activity limitations and / or participation restrictions LOW Complexity : No comorbidities that affect functional and no verbal or physical assistance needed to complete eval tasks       Based on the above components, the patient evaluation is determined to be of the following complexity level: LOW   Pain Rating:  Pt reporting minimal to moderate pain with activity    Activity Tolerance:   Fair and SpO2 stable on RA    After treatment patient left in no apparent distress:    Sitting in chair, Call bell within reach and Bed / chair alarm activated    COMMUNICATION/EDUCATION:   The patients plan of care was discussed with: Physical therapist and Registered nurse.      Home safety education was provided and the patient/caregiver indicated understanding., Patient/family have participated as able in goal setting and plan of care. and Patient/family agree to work toward stated goals and plan of care. This patients plan of care is appropriate for delegation to [facility].     Thank you for this referral.  Nicki Helm, OT  Time Calculation: 33 mins

## 2021-04-27 NOTE — PROGRESS NOTES
Cooperative and MAEEW, emotional and easy to cry. States she is always in pain. 's. VSS. Wagoner to SD. Dsg Mid back intact with hemovac drain minimal sanginous drainage.

## 2021-04-27 NOTE — ADVANCED PRACTICE NURSE
4/27/2021      RE: Diony Russell    To Whom it May Concern:      Please excuse Diony Russell from work on 4/26/2021. Please feel free to contact my office if you have any questions or concerns. Thank you for your assistance in this matter.     Sincerely,          Gino Arana NP   [phone]

## 2021-04-28 VITALS
OXYGEN SATURATION: 97 % | HEART RATE: 105 BPM | SYSTOLIC BLOOD PRESSURE: 142 MMHG | TEMPERATURE: 99.1 F | DIASTOLIC BLOOD PRESSURE: 84 MMHG | RESPIRATION RATE: 16 BRPM | BODY MASS INDEX: 34.4 KG/M2 | WEIGHT: 201.5 LBS | HEIGHT: 64 IN

## 2021-04-28 LAB
ANION GAP SERPL CALC-SCNC: 6 MMOL/L (ref 5–15)
BUN SERPL-MCNC: 6 MG/DL (ref 6–20)
BUN/CREAT SERPL: 11 (ref 12–20)
CALCIUM SERPL-MCNC: 8.2 MG/DL (ref 8.5–10.1)
CHLORIDE SERPL-SCNC: 103 MMOL/L (ref 97–108)
CO2 SERPL-SCNC: 25 MMOL/L (ref 21–32)
CREAT SERPL-MCNC: 0.55 MG/DL (ref 0.55–1.02)
GLUCOSE SERPL-MCNC: 137 MG/DL (ref 65–100)
HGB BLD-MCNC: 11 G/DL (ref 11.5–16)
POTASSIUM SERPL-SCNC: 3.5 MMOL/L (ref 3.5–5.1)
SODIUM SERPL-SCNC: 134 MMOL/L (ref 136–145)

## 2021-04-28 PROCEDURE — 94760 N-INVAS EAR/PLS OXIMETRY 1: CPT

## 2021-04-28 PROCEDURE — 85018 HEMOGLOBIN: CPT

## 2021-04-28 PROCEDURE — 80048 BASIC METABOLIC PNL TOTAL CA: CPT

## 2021-04-28 PROCEDURE — 74011250637 HC RX REV CODE- 250/637: Performed by: ORTHOPAEDIC SURGERY

## 2021-04-28 PROCEDURE — 97530 THERAPEUTIC ACTIVITIES: CPT

## 2021-04-28 PROCEDURE — 36415 COLL VENOUS BLD VENIPUNCTURE: CPT

## 2021-04-28 PROCEDURE — 97116 GAIT TRAINING THERAPY: CPT

## 2021-04-28 PROCEDURE — 97535 SELF CARE MNGMENT TRAINING: CPT

## 2021-04-28 RX ORDER — DOCUSATE SODIUM 100 MG/1
100 CAPSULE, LIQUID FILLED ORAL 2 TIMES DAILY
Qty: 60 CAP | Refills: 0 | Status: SHIPPED | OUTPATIENT
Start: 2021-04-28 | End: 2021-08-26

## 2021-04-28 RX ORDER — CYCLOBENZAPRINE HCL 10 MG
10 TABLET ORAL
Qty: 30 TAB | Refills: 0 | Status: SHIPPED | OUTPATIENT
Start: 2021-04-28 | End: 2021-08-26

## 2021-04-28 RX ORDER — OXYCODONE HYDROCHLORIDE 5 MG/1
5-10 CAPSULE ORAL
Qty: 40 CAP | Refills: 0 | Status: SHIPPED | OUTPATIENT
Start: 2021-04-28 | End: 2021-05-05

## 2021-04-28 RX ADMIN — Medication 10 ML: at 14:00

## 2021-04-28 RX ADMIN — OXYCODONE 10 MG: 5 TABLET ORAL at 12:02

## 2021-04-28 RX ADMIN — POLYETHYLENE GLYCOL 3350 17 G: 17 POWDER, FOR SOLUTION ORAL at 09:08

## 2021-04-28 RX ADMIN — DOCUSATE SODIUM 50MG AND SENNOSIDES 8.6MG 1 TABLET: 8.6; 5 TABLET, FILM COATED ORAL at 09:08

## 2021-04-28 RX ADMIN — DULOXETINE HYDROCHLORIDE 60 MG: 30 CAPSULE, DELAYED RELEASE ORAL at 09:08

## 2021-04-28 RX ADMIN — OXYCODONE 10 MG: 5 TABLET ORAL at 03:02

## 2021-04-28 RX ADMIN — GABAPENTIN 300 MG: 300 CAPSULE ORAL at 15:16

## 2021-04-28 RX ADMIN — OXYCODONE 10 MG: 5 TABLET ORAL at 15:16

## 2021-04-28 RX ADMIN — OXYCODONE 10 MG: 5 TABLET ORAL at 06:00

## 2021-04-28 RX ADMIN — CHOLECALCIFEROL TAB 125 MCG (5000 UNIT) 5000 UNITS: 125 TAB at 09:08

## 2021-04-28 RX ADMIN — LEVOTHYROXINE SODIUM 150 MCG: 0.15 TABLET ORAL at 06:01

## 2021-04-28 RX ADMIN — FAMOTIDINE 20 MG: 20 TABLET, FILM COATED ORAL at 09:08

## 2021-04-28 RX ADMIN — LINACLOTIDE 145 MCG: 145 CAPSULE, GELATIN COATED ORAL at 06:01

## 2021-04-28 RX ADMIN — GABAPENTIN 300 MG: 300 CAPSULE ORAL at 09:08

## 2021-04-28 RX ADMIN — OXYCODONE 10 MG: 5 TABLET ORAL at 09:08

## 2021-04-28 NOTE — PROGRESS NOTES
Patient seen by physical therapy and is cleared for discharge from PT perspective. RN aware. Full evaluation to follow.

## 2021-04-28 NOTE — PROGRESS NOTES
Nurse handed patient a copy of instructions. Instructions also included a list of medications. Nurse read and explained medications and instructions to her Medications E-scribed to her pharmacy.  Verbalized understanding

## 2021-04-28 NOTE — DISCHARGE INSTRUCTIONS
Lumbar Spinal Surgery Discharge Instructions   Dr. Alcira Avendano  [phone]    Activity:    [address] You are going home a well person, be as active as possible. Your only exercise should be walking. Start with short frequent walks and increase your walking distance each day. Start with walking twice a day for 5 minutes and increase your distance each day 2-3 minutes until you reach 25 minutes twice a day. Limit the amount of time you sit to 20-30 minute intervals. Sitting for prolonged periods of time will be uncomfortable for you following your surgery.  No bending, lifting (of 5lbs or more), twisting, or straining.  Do not drive until your doctor states you may do so. However, you may ride in a car for short distances.  If you are required to wear a brace, you should wear it at all times when you are out of bed. You may remove it when sleeping unless your physician advises you against it.  When you are in the bed, you may lay on your back or on either side. Do not lie on your stomach.  You may resume sexual relations 3-4 weeks after surgery depending on how you are feeling.  Continue to use your incentive spirometer for deep breathing exercises. Driving:   You may not drive or return to work until instructed by your physician. However, you may ride in the car for short periods of time. Brace:   If you have a back brace, you should wear your brace at all times when you are out of bed. Do not wear the brace while in bed or showering.  Remember to always wear a cotton t-shirt underneath your brace.  Do not bend or twist when your brace is off. Diet:   You may resume your regular home diet as tolerated. If your throat is sore, you should eat soft foods for the first couple of days.  Be sure to drink plenty of fluids; it is important to keep yourself hydrated.  Avoid alcoholic beverages and ABSOLUTELY NO tobacco products.  Tobacco products will interfere with your healing. If you continue to use tobacco, you may end up needing another surgery in the future. Dressing: You have on a Prineo dressing. This is a waterproof bacteriostatic dressing that  requires no post-operative care. Please do not peel the dressing off, or apply any  oil based products, as they may expedite the deterioration of the mesh. The  dressing will slowly chip off on its own.  Do not rub or apply lotion or ointments to incision site. Shower:   You may shower 5 days after your surgery.  You may remove your brace during showers.  NO not use tub baths, swimming pools or Jacuzzis. Medications:  **Your prescriptions have been e-scribed to the pharmacy on file at Dr. Idania Saucedo office. **   Check with your physician before taking any anti-inflammatory medications. (Advil, Aleve, Ibuprofen, Aspirin)   Take your pain medication as directed   Do NOT take additional Tylenol if your prescribed pain medication has acetaminophen in it (Endocet/Percocet, Lortab, Norco)   It is important to have regular bowel movements. Pain medications can be constipating. Stool softeners, warm prune juice, increasing your water intake, and increasing fiber in your diet can help in preventing constipation.  Do NOT take laxatives if at all possible except in severe situations. It can result in a vicious cycle of constipation and diarrhea. *** VERY IMPORTANT - PLEASE READ*** Please monitor the supply of your pain medicine closely and if it looks like you're going to run out of pain medicine over the weekend please call the office on [address] prior to the weekend to request a refill. The on call physician for nights and weekends CANNOT refill pain medicine. You will either have to wait until Monday morning when the office re-opens or you will have to go to an urgent care/ emergency room if you are in need of pain medicine.     Follow-Up    Please call ASAP to schedule your 1st post-operative appointment. This should be approximately 3 weeks from your surgery date. Notify your physician in you develop any of the following conditions:   Fever above 101 degrees for 24 hours.  Nausea or vomiting.  Severe headache.  Inability to urinate   Loss of bowel or bladder function (sudden onset of incontinence)   Changes in sensation in your extremities (numbness, tingling, loss of color).  Severe pain or pain not relieved by medications.  Redness, swelling, or drainage from your incision.  Persistent pain in the chest.    Pain in the calf of either leg.  Increased weakness (if this is greater than before your surgery). If you have any questions about your dressing contact your Orthopaedic Surgeons office. OFFICE OF DR. Leanne Watson   [phone]  OUR NEW ADDRESS IS [address], [facility] [address] [facility]     ** IMPORTANT: UNDER YOUR HONEYCOMB DRESSING, YOU HAVE A PRINEO ADHESIVE MESH DIRECTLY OVER YOUR INCISION. THIS MESH WAS STERILELY GLUED TO YOUR SKIN DURING SURGERY. DO NOT REMOVE THIS. NO ONE ELSE SHOULD REMOVE IT EITHER. IT WILL COME OFF ON ITS OWN OVER TIME    YOUR HONEYCOMB DRESSING NEEDS TO BE REMOVED PRIOR TO SHOWERING. THEN THE PRINEO MESH CAN BE LEFT OPEN TO AIR    * WEAR YOUR BRACE AS ADVISED    * NO DRIVING UNTIL YOU ARE CLEARED TO DO SO BY YOUR SURGEON    * LIMIT LIFTING, BENDING AND TWISTING.  NO LIFTING MORE THAN 5 LBS    * MAKE SURE YOU ARE GETTING GOOD NUTRITION (Lean Protein, Vitamin D AND Calcium)    * DO NOT TAKE ANY NSAIDs FOR THE FIRST 3 MONTHS AFTER SURGERY (such as Advil/Ibuprofen/Motrin, Aleve/Naproxen/Naprosyn, Diclofenac, Celebrex, Meloxicam, Indomethacin, Goody's powder, BC powder etc.)    * NO NICOTINE PRODUCTS    * FULLY READ YOUR DISCHARGE INSTRUCTIONS

## 2021-04-28 NOTE — PROGRESS NOTES
Bedside shift change report given to ELADIA Rivera (oncoming nurse) by Golden Claros (offgoing nurse). Report included the following information SBAR, Kardex, Intake/Output and MAR.

## 2021-04-28 NOTE — PROGRESS NOTES
ORTHOPAEDIC LUMBAR FUSION PROGRESS NOTE    NAME:     Yulia Lyn   :       1985   MRN:       [id number]   DATE:      2021    POD:    2 Days Post-Op  S/P:    Procedure(s):  REVISION L4-L5  LAMINECTOMY, L4-S1 FUSION, INSTRUMENTATION, TLIF, BONE GRAFT, REMOVAL OF HARDWARE (LATEX ALLERGY)    SUBJECTIVE:    C/O back pain along surgical incision  Pt states her pain is worsened by the hard hospital bed  Denies nausea/vomiting, chest pain, headache or shortness of breath    Recent Labs     21  0609   HGB 11.0*   *   K 3.5      CO2 25   BUN 6   CREA 0.55   *     Patient Vitals for the past 12 hrs:   BP Temp Pulse Resp SpO2   21 0850 (!) 150/80 99 °F (37.2 °C) 100 16 96 %   21 0345 (!) 145/88 99.1 °F (37.3 °C) (!) 104 17 95 %   21 2309 112/73 99.5 °F (37.5 °C) (!) 101 16 97 %       EXAM:  Positive strength/ROM bilat lower ext. Neuro intact to sensation  Calves, soft & nontender  Dressings clean, dry and intact     PLAN:  PO pain medications prn  PT/OT, OOB  Last HV drain output - 20mL. Remove drain. Will d/c home today if cleared by therapy. Dr. Gerber Albarran is in room to assess pt. He is aware and agrees with above.      Chandan Galicia NP

## 2021-04-28 NOTE — PROGRESS NOTES
Problem: Mobility Impaired (Adult and Pediatric)  Goal: *Acute Goals and Plan of Care (Insert Text)  Description: FUNCTIONAL STATUS PRIOR TO ADMISSION: The patient was functional at the wheelchair level and was modified independent for transfers to the chair; used for long distances. HOME SUPPORT PRIOR TO ADMISSION: The patient lived with 3 roommates; state boyfriend would assist with some ADL's as needed. Physical Therapy Goals  Initiated 4/27/2021    1. Patient will move from supine to sit and sit to supine  in bed with independence within 4 days. 2. Patient will perform sit to stand with modified independence within 4 days. 3. Patient will ambulate with modified independence for 150 feet with the least restrictive device within 4 days. 4. Patient will ascend/descend 4 stairs with 1 handrail(s) with supervision/set-up within 4 days. 5. Patient will verbalize and demonstrate understanding of spinal precautions (No bending, lifting greater than 5 lbs, or twisting; log-roll technique; frequent repositioning as instructed) within 4 days.        Outcome: Progressing Towards Goal     PHYSICAL THERAPY TREATMENT  Patient: Narda Avila (89 y.o. female)  Date: 4/28/2021  Diagnosis: Lumbar radiculitis [M54.16]  Spinal stenosis of lumbar region with neurogenic claudication [M48.062]  Spondylolisthesis, lumbar region [M43.16]  Lumbar stenosis with neurogenic claudication [M48.062] <principal problem not specified>  Procedure(s) (LRB):  REVISION L4-L5  LAMINECTOMY, L4-S1 FUSION, INSTRUMENTATION, TLIF, BONE GRAFT, REMOVAL OF HARDWARE (LATEX ALLERGY) (N/A) 2 Days Post-Op  Precautions: Fall, Back(no bending, lifting greater than 5 pounds, no twisting; log roll technique to get out of be; brace on when ambulating and sitting in chair) No bending, no lifting greater than 5 lbs, no twisting, log-roll technique, repositioning every 20-30 min except when sleeping, brace when OOB (if ordered)  Chart, physical therapy assessment, plan of care and goals were reviewed. ASSESSMENT  Patient continues with skilled PT services and is progressing towards goals. She ambulates increased distance, requires decreased assistance for transfers and ambulation, and progresses to stair training. She verbalizes and demonstrates understanding regarding precautions and dons brace appropriately for activity. She demonstrates mobility sufficient for function within home environment and is cleared for discharge from PT perspective. Hemovac in place. Current Level of Function Impacting Discharge (mobility/balance): min assist stair negotiation (HHA)    Other factors to consider for discharge: none noted         PLAN :  Patient continues to benefit from skilled intervention to address the above impairments. Continue treatment per established plan of care. to address goals. Recommendation for discharge: (in order for the patient to meet his/her long term goals)  Physical therapy at least 2 days/week in the home AND ensure assist and/or supervision for safety with all functional mobility vs. Outpatient PT. This discharge recommendation:  Has been made in collaboration with the attending provider and/or case management    IF patient discharges home will need the following DME: patient owns DME required for discharge       SUBJECTIVE:   Patient stated If they're not giving me IV medicine I'd rather be at home.     Pt received supine, agreeable to PT and cleared by RN. OBJECTIVE DATA SUMMARY:   Critical Behavior:  Neurologic State: Alert  Orientation Level: Appropriate for age  Cognition: Appropriate decision making  Safety/Judgement: Good awareness of safety precautions, Insight into deficits    Spinal diagnosis intervention:  The patient stated 3/3 back precautions when prompted. Reviewed all 3 back precautions, log roll technique, and sitting for 30 minutes at a time.  The patient required no cues to maintain back precautions during functional activity. Reviewed back brace application and wear schedule. Brace donned with supervision/set-up      Functional Mobility Training:    Bed Mobility:  Log Rolling: Modified independent  Supine to Sit: Modified independent  Sit to Supine: Modified independent  Scooting: Modified independent        Transfers:  Sit to Stand: Supervision  Stand to Sit: Supervision                        Car Transfers: Pt educated regarding appropriate techniques for car transfers within relevant precautions and verbalizes understanding. Pt states significant other will provide transportation at discharge. Balance:  Sitting: Intact  Standing: With support  Standing - Static: Good  Standing - Dynamic : Good  Ambulation/Gait Training:  Distance (ft): 110 Feet (ft)  Assistive Device: Walker, rolling;Gait belt;Brace/Splint  Ambulation - Level of Assistance: Supervision        Gait Abnormalities: Decreased step clearance; Antalgic              Speed/Halley: Pace decreased (<100 feet/min)                       Stairs:  Number of Stairs Trained: 2  Stairs - Level of Assistance: Minimum assistance(HHA contralateral to rail)   Rail Use: Right       Pain Ratin/10 back pain; states 7/10 pain at best during admission and pre-surgery. Offered education, cold pack with appropriate toweling, education, repositoning. Activity Tolerance:   Good    After treatment patient left in no apparent distress:   Call bell within reach, Bed / chair alarm activated, Side rails x 3, and L sidelying with pillow prop between knees. COMMUNICATION/COLLABORATION:   The patients plan of care was discussed with: Occupational therapist, Registered nurse, and Rehabilitation technician.      Joyce Ramirez, PT, DPT   Time Calculation: 25 mins

## 2021-04-28 NOTE — PROGRESS NOTES
Problem: Self Care Deficits Care Plan (Adult)  Goal: *Acute Goals and Plan of Care (Insert Text)  Description: FUNCTIONAL STATUS PRIOR TO ADMISSION: The patient was functional at the wheelchair level and was modified independent for transfers to the chair; used for long distances. HOME SUPPORT PRIOR TO ADMISSION: The patient lived with 3 roommates; states boyfriend would assist with some ADL's as needed. Occupational Therapy Goals  Initiated 4/27/2021    1. Patient will perform lower body dressing with modified independence using AE PRN within 7 days. 2.  Patient will perform toileting and toilet transfer with modified independence using most appropriate DME within 7 days. 3.  Patient will perform grooming, standing at sink, at modified independence within 7 days. 4.  Patient will don/doff back brace at modified independence within 7 days. 5.  Patient will verbalize/demonstrate 3/3 back precautions during ADL tasks without cues within 7 days. Outcome: Progressing Towards Goal     OCCUPATIONAL THERAPY TREATMENT  Patient: Haydee Jackson (23 y.o. female)  Date: 4/28/2021  Diagnosis: Lumbar radiculitis [M54.16]  Spinal stenosis of lumbar region with neurogenic claudication [M48.062]  Spondylolisthesis, lumbar region [M43.16]  Lumbar stenosis with neurogenic claudication [M48.062] <principal problem not specified>  Procedure(s) (LRB):  REVISION L4-L5  LAMINECTOMY, L4-S1 FUSION, INSTRUMENTATION, TLIF, BONE GRAFT, REMOVAL OF HARDWARE (LATEX ALLERGY) (N/A) 2 Days Post-Op  Precautions: Fall, Back(no bending, lifting greater than 5 pounds, no twisting; log roll technique to get out of be; brace on when ambulating and sitting in chair)  Chart, occupational therapy assessment, plan of care, and goals were reviewed. ASSESSMENT  Patient continues with skilled OT services and is progressing towards goals. Pt reporting increased pain this session but continues to be motivated to participate with therapy. She is received in bed, performing bed mobility with overall MOD I. She is receptive to education regarding ADL adaptations and participates in AE training this session. Pt manages LSO with set up to min A and reports improvement in pain with standing. Reviewed shower transfer technique and improved safety with bathroom ADLs and pt verbalized understanding. Continue to recommend follow up [facility] at discharge. Current Level of Function Impacting Discharge (ADLs): overall set up to CGA/min A for ADLs    Other factors to consider for discharge: back precautions; has support of boyfriend and roommates; age         PLAN :  Patient continues to benefit from skilled intervention to address the above impairments. Continue treatment per established plan of care to address goals. Recommend with staff: OOB to chair for all meals; mobility to bathroom for toileting; ADLs as able with A as needed    Recommend next OT session: standing tasks at sink; bathroom ADLs    Recommendation for discharge: (in order for the patient to meet his/her long term goals)  Occupational therapy at least 2 days/week in the home vs. none    This discharge recommendation:  Has been made in collaboration with the attending provider and/or case management    IF patient discharges home will need the following DME: AE: long handled dressing       SUBJECTIVE:   Patient stated It hurts a lot ore today, I can't wait for that drain to come out.     OBJECTIVE DATA SUMMARY:   Cognitive/Behavioral Status:  Neurologic State: Alert  Orientation Level: Oriented X4  Cognition: Appropriate decision making; Appropriate for age attention/concentration; Appropriate safety awareness; Follows commands  Perception: Appears intact  Perseveration: No perseveration noted  Safety/Judgement: Awareness of environment; Fall prevention;Good awareness of safety precautions    Functional Mobility and Transfers for ADLs:  Bed Mobility:  Rolling: Modified independent  Supine to Sit: Modified independent  Sit to Supine: (pt seated up in chair at end of session)  Scooting: Modified independent    Transfers:  Sit to Stand: Supervision  Bed to Chair: Stand-by assistance    Balance:  Sitting: Intact  Standing: With support  Standing - Static: Good  Standing - Dynamic : Good    ADL Intervention:  Upper Body Dressing Assistance  Orthotics(Brace): Set-up; Minimum assistance(to orient around back)  Cues: Physical assistance;Verbal cues provided    Lower Body Dressing Assistance  Underpants: Contact guard assistance; Compensatory technique training(with reacher)  Pants With Elastic Waist: Contact guard assistance;Minimum assistance; Compensatory technique training(with reacher)  Socks: Stand-by assistance; Compensatory technique training(reacher to doff; sock aid to don)  Leg Crossed Method Used: No  Position Performed: Seated in chair  Cues: Don;Tactile cues provided;Verbal cues provided;Visual cues provided  Adaptive Equipment Used: Reacher;Sock aid; Walker    Cognitive Retraining  Safety/Judgement: Awareness of environment; Fall prevention;Good awareness of safety precautions    The patient recalled and demonstrated 3/3 back precautions. Reviewed all 3 with patient. Bathing: Patient instructed and indicated understanding when bathing to not submerge wound in water, stand to shower or sponge bathe, cover wound with plastic and tape to ensure no water reaches bandage/wound without cues. Dressing brace: Patient instructed and demonstrated to don/doff velcro on brace using dominant side, keeping non-dominant side intact. Patient instructed and demonstrated in meantime of being able to stand with back against wall to don/doff brace, to don/doff seated using lap and bed/chair surface to support brace while manipulating. Dressing lower body: Patient instructed to don brace first and on the benefits to remain seated to don all clothing to increase independence with precautions and pain management.   Toileting: Patient instructed on the benefits of using flushable wet wipes and toilet tongs if decreased reach or pain for yuriy care. Also, the benefits of a reacher to aid in clothing management. Home safety: Patient instructed and indicated understanding on home modifications and safety (raise height of ADL objects, appropriate height of chair surfaces, recliner safety, change of floor surfaces, clear pathways) to increase independence and fall prevention with CGA. Standing: Patient instructed and indicated understanding to walk up to sink/counter top/surfaces, step into walker, square off while using objects, slide objects along surfaces, to increase adherence to back precautions and fall prevention. Patient instructed to increase amount of time standing in order to increase independence and tolerance with ADLs. During prolonged standing, can open cabinet door or place foot on stool to decrease spinal pressure/increase pain. Tub transfer: Patient instructed and indicated understanding regarding when it is safe to begin transfer into tub (complete stairs with PT, advance exercises with PT high enough to clear tub height, and while clothes donned practice with another person present). Patient instructed and indicated understanding to use the same technique as used with stairs when entering and exiting tub (\"up with good leg, down with bad leg\"). Patient instructed and indicated understanding the benefits of maintaining activity tolerance, functional mobility, and independence with self care tasks during acute stay  to ensure safe return home and to baseline. Encouraged patient to increase frequency and duration OOB, not sitting longer than 30 mins without marching/walking with staff, be out of bed for all meals, perform daily ADLs (as approved by RN/MD regarding bathing etc), and performing functional mobility to/from bathroom.  Patient instruction and indicated understanding on body mechanics, ergonomics and gravitational force on the spine during different body positions to plan activities in prep for return home to complete instrumental ADLs and back to work safely. Pain:  Pt reporting increased incisional pain    Activity Tolerance:   Good    After treatment patient left in no apparent distress:   Sitting in chair, Call bell within reach and Bed / chair alarm activated    COMMUNICATION/COLLABORATION:   The patients plan of care was discussed with: Physical therapist and Registered nurse.      Bryce Rodriguez OT  Time Calculation: 23 mins

## 2021-04-28 NOTE — PROGRESS NOTES
4/28/2021  11:14 AM  RUR:  13%  Risk Level: [x]Low []Moderate []High  Value-based purchasing: [] Yes [x] No  Bundle patient: [] Yes [x] No   Specify:     Transition of care plan:  1. Discharge order submitted. Pt has medically cleared. 2. Home with  with [facility], and DC clinicals were attached in AllScript[employer]. 3. Outpatient follow-up. 4. Pt's family to transport. Care Management Interventions  PCP Verified by CM: Yes([place])  Mode of Transport at Discharge:  Other (see comment)(Family)  Transition of Care Consult (CM Consult): Discharge Planning, [address]: No  Reason Outside [place]: Physician referred to specific agency, Patient already serviced by other home care/hospice agency  MyChart Signup: No  Discharge Durable Medical Equipment: No  Physical Therapy Consult: Yes  Occupational Therapy Consult: Yes  Speech Therapy Consult: No  Current Support Network: Lives with Spouse  Confirm Follow Up Transport: Family  The Plan for Transition of Care is Related to the Following Treatment Goals : Lumbar radiculitis  The Patient and/or Patient Representative was Provided with a Choice of Provider and Agrees with the Discharge Plan?: (S) Yes  Name of the Patient Representative Who was Provided with a Choice of Provider and Agrees with the Discharge Plan: Self  Freedom of Choice List was Provided with Basic Dialogue that Supports the Patient's Individualized Plan of Care/Goals, Treatment Preferences and Shares the Quality Data Associated with the Providers?: (S) Yes  Discharge Location  Discharge Placement: Home with home health

## 2021-04-29 ENCOUNTER — PATIENT OUTREACH (OUTPATIENT)
Dept: CASE MANAGEMENT | Age: 36
End: 2021-04-29

## 2021-04-29 NOTE — PROGRESS NOTES
Patient contacted regarding [facility]. Discussed COVID-19 related testing which was available at this time. Test results were negative. Patient informed of results, if available? yes     Care Transition Nurse contacted the patient by telephone to perform post discharge assessment. Call within 2 business days of discharge: Yes Verified name and  with patient as identifiers. Provided introduction to self, and explanation of the CTN/ACM role, and reason for call due to risk factors for infection and/or exposure to COVID-19. Symptoms reviewed with patient who verbalized the following symptoms: no worsening symptoms      Due to no new or worsening symptoms encounter was not routed to provider for escalation. Discussed follow-up appointments. If no appointment was previously scheduled, appointment scheduling offered:  donita   [address] follow up appointment(s):   Future Appointments   Date Time Provider Nimo Prabhakar   2021  2:15 PM Chidi Avalos MD CDE BS [facility]     Non-BS follow up appointment(s): none     Advance Care Planning:   Does patient have an Advance Directive:  patient declined education. Patient has following risk factors of: no known risk factors. CTN reviewed discharge instructions, medical action plan and red flags such as increased shortness of breath, increasing fever and signs of decompensation with patient who verbalized understanding. Discussed exposure protocols and quarantine with CDC Guidelines What to do if you are sick with coronavirus disease .  Patient was given an opportunity for questions and concerns. The patient agrees to contact the [facility] exposure line [phone], [facility]  ([phone] and PCP office for questions related to their healthcare. CTN provided contact information for future needs. Reviewed and educated patient on any new and changed medications related to discharge diagnosis.  Discussed analgesics- opiates with possible constipation. Pt states she is in contact with her pain management clinic for ongoing pain management    Was patient discharged with a pulse oximeter?  no

## 2021-05-05 ENCOUNTER — VIRTUAL VISIT (OUTPATIENT)
Dept: ENDOCRINOLOGY | Age: 36
End: 2021-05-05
Payer: COMMERCIAL

## 2021-05-05 DIAGNOSIS — E89.0 POSTOPERATIVE HYPOTHYROIDISM: ICD-10-CM

## 2021-05-05 DIAGNOSIS — C73 THYROID CANCER (HCC): Primary | ICD-10-CM

## 2021-05-05 LAB — TSH SERPL DL<=0.005 MIU/L-ACNC: 1.08 UIU/ML (ref 0.45–4.5)

## 2021-05-05 PROCEDURE — 99214 OFFICE O/P EST MOD 30 MIN: CPT | Performed by: INTERNAL MEDICINE

## 2021-05-05 RX ORDER — LEVOTHYROXINE SODIUM 150 UG/1
TABLET ORAL
Qty: 90 TAB | Refills: 3 | Status: SHIPPED | OUTPATIENT
Start: 2021-05-05

## 2021-05-05 NOTE — PROGRESS NOTES
VV-Pt is aware of billable appt depending on insurance plan. Preferred number [phone]    Pt verbally agrees to labs, orders, and others to be mailed to confirmed home address. Identified pt with two pt identifiers(name and ). Reviewed record in preparation for visit and have obtained necessary documentation. All patient medications has been reviewed. Chief Complaint   Patient presents with    Thyroid Problem       Health Maintenance Review: Patient reminded of \"due or due soon\" health maintenance. I have asked the patient to contact his/her primary care provider (PCP) for follow-up on his/her health maintenance. Wt Readings from Last 3 Encounters:   21 201 lb 8 oz (91.4 kg)   21 201 lb 8 oz (91.4 kg)   21 190 lb 4.1 oz (86.3 kg)     Temp Readings from Last 3 Encounters:   21 99.1 °F (37.3 °C)   21 97.8 °F (36.6 °C)   21 97.7 °F (36.5 °C)     BP Readings from Last 3 Encounters:   21 (!) 142/84   21 (!) 132/93   21 121/76     Pulse Readings from Last 3 Encounters:   21 (!) 105   21 (!) 112   21 (!) 116         Coordination of Care Questionnaire:   1) Have you been to an emergency room, urgent care, or hospitalized since your last visit? YES     2021 - 2021 (2 days)  Hebert Pastor MD  Last attending  Treatment team       2. Have seen or consulted any other health care provider since your last visit? NO    Advance Care Planning:   End of Life Planning: has NO advanced directive - not interested in additional information      Patient is accompanied by self I have received verbal consent from Paulette Rouse to discuss any/all medical information while they are present in the room.

## 2021-05-10 NOTE — PROGRESS NOTES
**THIS IS A VIRTUAL VISIT VIA AUDIO- VIDEO SYNCHRONOUS DISCUSSION. PATIENT AGREED TO HAVE THEIR CARE DELIVERED OVER A [employer]. ME VIDEO VISIT IN PLACE OF THEIR REGULARLY SCHEDULED OFFICE VISIT**   Pt  is aware that this is a billable encounter and is responsible for copays/deductibles   Patient gave a verbal consent to proceed with virtual video visit   Patient is at home and I, the provider,  am at the office care diabetes and endocrinology      HISTORY OF PRESENT ILLNESS  Hortencia Triplett is a 37 y.o. female. HPI  f/u after last  visit for hypothyroidism and thyroid cancer management  From March 3 2020     Lost 2 lbs   Compliant with synthroid 150 mcg a day         March 2020   F/u after 9 months   Compliant with synthroid         Prior history :    S/p surgery on --  Total thyroidectomy  Dec  5 2017   Started on synthroid 125 mcg post-op  still breast feeding   Hair loss is better per her after surgery          Old history :   She was found to have the nodule in nov 2016   She had biopsy on it which showed papillary thyroid cancer     Pt is accompanied by her . They brought in a flash drive with medical information  Path was not available on my review    She had biospies on LN , which are enlarged bilaterally, all of them are negative for Thyroglobulin   Suggested surgery in second trimester, but got postponed     She moved from [place] to [place] in July last week   She had her baby in Nov 1 st 2017   She is going in for surgery on DEC 5th by Dr. Santa So   She is currently BREAST FEEDING       Review of Systems   Constitutional: Negative. Psychiatric/Behavioral: Negative for depression and memory loss. The patient does not have insomnia. Physical Exam   Constitutional: She is oriented to person, place, and time. She appears well-developed and well-nourished. Neck:  S/p surgery no thyromegaly  present. Psychiatric: She has a normal mood and affect.        Lab Results   Component Value Date/Time TSH 1.080 05/04/2021 04:21 PM    T4, Free 1.1 09/02/2020 03:46 PM      ASSESSMENT and PLAN    1. Hypothyroidism : post-operative   Started on synthroid 125 mcg on Dec 11 th , Increased the dose to  150 mcg on jan 10th 2018   Deleted notes  May 2019  -   TSH is 0.1  - CONTINUE  MCG A DAY Eugenie Soledad EMPHASIZED   Feb 2020  -  Euthyroid on 150 mcg dose of UNITHROID   Sept 2020  -  Euthyroid   May 2021 -  Euthyroid , stay on 150 mcg brand UNIthroid         2. Thyroid cancer :   Right side nodule  Of 3.3 cm by 1.8 cm by 1.8 cm, has increased vascularity and increased microcalcifications   Had FNA on nov 22 2016 -  PTC ( could nto review the path from old records)    Had ct neck  Dec 27 2106 and usg reportes reviewed from oct 3 2016 and dec 1 2016   had FNA with TG wash out on bilateral lateral Level III  Lymph nodes - negative for cancer at [facility]     s/p total thyroidectomy  on Dec 5 2017 by Dr. Glen Corley   Reviewed surgical pathology :  Tumor is multifocal ( right lobe 2.5 cm and isthmus 1 mm )  Usual PTC features     Did not  plan for GRAY therapy or ablation because of breast feeding status   Now, she has stopped breast feeding     Given that pt has all  features of  LOW risk for recurrence, will not pursue GRAY ablation therapy and pt is agreeable to it   started on suppressive dose of levothyroxine, but pt had non compliance issues and shift working style     TG <0.1  In  Jan 2019 - but TSH was suppressed to 0.65  usg  Feb 2019 - negative for recurrence      feb 2020  : Will plan for stimulated TSH  And  TG calculation with THYROGEN  Ultrasound  To be done along with  [facility]     Sept 2020  : pt and I both, are ready for thyrogen stim test - it is getting done at [facility]  Will do baseline labs and go ahead with it if  TSH is not way suppressed   Pt is given the instructions       2.  Family h/o  Thyroid cancer in maternal grandmother       F/u in 6  months     Pursuant  To the Emergency declaration under the [employer] and the [employer], 6367 waiver authority and the [facility], to reduce the patient's risk of exposure to  COVID-19 and provide continuity of care for an established patient.

## 2021-05-14 NOTE — DISCHARGE SUMMARY
DISCHARGE SUMMARY     Patient: Shiva Donato                             Medical Record Number: [id number]                : 1985  Age: 37 y.o. Admit Date: 2021  Discharge Date: 2021  Admission Diagnosis: Lumbar radiculitis [M54.16]  Spinal stenosis of lumbar region with neurogenic claudication [M48.062]  Spondylolisthesis, lumbar region [M43.16]  Lumbar stenosis with neurogenic claudication [M48.062]  Discharge Diagnosis: Lumbar radiculitis [M54.16]  Spinal stenosis of lumbar region  Procedures: Procedure(s):  REVISION L4-L5  LAMINECTOMY, L4-S1 FUSION, INSTRUMENTATION, TLIF, BONE GRAFT, REMOVAL OF HARDWARE (LATEX ALLERGY)  Surgeon: Cynthia Massey MD  Co-surgeon:   Assistants:   Anesthesia: General  Complications: None     History of Present Illness:  Shiva Donato is a 37 y.o. female with a history of intractable low back and radiculopathy. Despite conservative management and after clinical and radiographic evaluation, it was determined that she suffered from lumbar stenosis  and lumbar spondylolisthesis and would benefit from Procedure(s):  REVISION L4-L5  LAMINECTOMY, L4-S1 FUSION, INSTRUMENTATION, TLIF, BONE GRAFT, REMOVAL OF HARDWARE (LATEX ALLERGY), which she consented to undergo after a discussion of the risks, benefits, alternatives, rehab concerns, and potential complications of surgery. Hospital Course:  Shiva Donato tolerated the procedure well. She was transferred  to the recovery room in stable condition. After a brief stay, the patient was then transferred to the Orthopedic floor. On postoperative day #1, the dressing was clean and dry and she was neurovascularly intact. The patient was afebrile and vital signs were stable. Calves were soft and non-tender bilaterally. Shiva Donato made excellent progress with physical therapy and was discharged to Home with home health in stable condition on postoperative day 2.   She was provided with routine postoperative instructions and advised to follow up in my office in 3 weeks following discharge from the hospital.  She was given prescriptions for medication to control post-operative symptoms. Discharge Medications:  Discharge Medication List as of 4/28/2021  3:40 PM      START taking these medications    Details   cyclobenzaprine (FLEXERIL) 10 mg tablet Take 1 Tab by mouth three (3) times daily as needed for Muscle Spasm(s). , Normal, Disp-30 Tab, R-0Call [phone] with questions      docusate sodium (COLACE) 100 mg capsule Take 1 Cap by mouth two (2) times a day., Normal, Disp-60 Cap, R-0         CONTINUE these medications which have CHANGED    Details   oxyCODONE (OXYIR) 5 mg capsule Take 1-2 Caps by mouth every four (4) hours as needed for Pain for up to 7 days. Max Daily Amount: 60 mg., Normal, Disp-40 Cap, R-0         CONTINUE these medications which have NOT CHANGED    Details   cholecalciferol (Vitamin D3) (5000 Units/125 mcg) tab tablet Take 1 Tab by mouth daily for 30 days. Indications: low vitamin D levels, Normal, Disp-30 Tab, R-0      gabapentin (NEURONTIN) 300 mg capsule Take 300 mg by mouth three (3) times daily. , Historical Med      tiZANidine (ZANAFLEX) 2 mg capsule Take 2 mg by mouth three (3) times daily. , Historical Med      multivitamin (ONE A DAY) tablet Take 1 Tab by mouth daily. [employer] Brand, Historical Med      multivitamin with minerals (HAIR,SKIN AND NAILS PO) Take  by mouth. @@ Gummies in AM, Historical Med      Unithroid 150 mcg tablet take 1 tablet by mouth daily BEFORE BREAKFAST (BRAND MEDICALLY NECESSARY), Normal, Disp-90 Tab, R-3, SARAHI      diphenhydrAMINE (Benadryl Allergy) 25 mg tablet Take 25 mg by mouth every evening., Historical Med      hydrOXYzine pamoate (VISTARIL) 50 mg capsule take 1 capsule by mouth every 8 hours if needed for anxiety, Normal, Disp-60 Cap, R-2      DULoxetine (CYMBALTA) 60 mg capsule Take 1 Cap by mouth daily. , Normal, Disp-30 Cap, R-5      linaCLOtide (Linzess) 145 mcg cap capsule Take 1 Cap by mouth Daily (before breakfast). , Normal, Disp-30 Cap, R-5         STOP taking these medications       HYDROcodone-acetaminophen (NORCO) 7.5-325 mg per tablet Comments:   Reason for Stopping:               FRACISCO Parker  4/28/2021  Orthopaedic Surgery  Physician Assistant to Dr. Estee Caraballo

## 2021-05-15 ENCOUNTER — HOSPITAL ENCOUNTER (EMERGENCY)
Age: 36
Discharge: HOME | End: 2021-05-15
Attending: EMERGENCY MEDICINE
Payer: COMMERCIAL

## 2021-05-15 VITALS
SYSTOLIC BLOOD PRESSURE: 134 MMHG | HEIGHT: 64 IN | BODY MASS INDEX: 34.15 KG/M2 | TEMPERATURE: 98.3 F | WEIGHT: 200 LBS | OXYGEN SATURATION: 98 % | RESPIRATION RATE: 18 BRPM | DIASTOLIC BLOOD PRESSURE: 86 MMHG

## 2021-05-15 DIAGNOSIS — G89.18 POST-OPERATIVE PAIN: Primary | ICD-10-CM

## 2021-05-15 PROCEDURE — 99282 EMERGENCY DEPT VISIT SF MDM: CPT

## 2021-05-15 RX ORDER — OXYCODONE HYDROCHLORIDE 5 MG/1
5-10 TABLET ORAL
COMMUNITY
Start: 2021-05-15 | End: 2021-05-22

## 2021-05-15 NOTE — ED PROVIDER NOTES
EMERGENCY DEPARTMENT HISTORY AND PHYSICAL EXAM      Date: 5/15/2021  Patient Name: Rowdy Friedman    History of Presenting Illness     Chief Complaint   Patient presents with    Medication Refill       History Provided By: Patient    HPI: Rowdy Friedman, 37 y.o. female with recent back surgery on 4/26/2021 here for medication refill of oxycodone. Patient states ran out of medication 3 days ago. Called orthopedic surgeon's office and they have not refilled it. There are no other complaints, changes, or physical findings at this time. PCP: Jen Lopes NP    No current facility-administered medications on file prior to encounter. Current Outpatient Medications on File Prior to Encounter   Medication Sig Dispense Refill    oxyCODONE IR (ROXICODONE) 5 mg immediate release tablet Take 5-10 mg by mouth every four (4) hours as needed.  Unithroid 150 mcg tablet BMN   take 1 tablet by mouth daily BEFORE BREAKFAST 90 Tab 3    cholecalciferol (Vitamin D3) (5000 Units/125 mcg) tab tablet Take 1 Tab by mouth daily for 30 days. Indications: low vitamin D levels 30 Tab 0    gabapentin (NEURONTIN) 300 mg capsule Take 300 mg by mouth three (3) times daily.  tiZANidine (ZANAFLEX) 2 mg capsule Take 2 mg by mouth three (3) times daily.  DULoxetine (CYMBALTA) 60 mg capsule Take 1 Cap by mouth daily. 30 Cap 5    linaCLOtide (Linzess) 145 mcg cap capsule Take 1 Cap by mouth Daily (before breakfast). 30 Cap 5    cyclobenzaprine (FLEXERIL) 10 mg tablet Take 1 Tab by mouth three (3) times daily as needed for Muscle Spasm(s). 30 Tab 0    docusate sodium (COLACE) 100 mg capsule Take 1 Cap by mouth two (2) times a day. 60 Cap 0    multivitamin (ONE A DAY) tablet Take 1 Tab by mouth daily. [employer] Brand      multivitamin with minerals (HAIR,SKIN AND NAILS PO) Take  by mouth. @@ Gummies in AM      diphenhydrAMINE (Benadryl Allergy) 25 mg tablet Take 25 mg by mouth every evening.       hydrOXYzine pamoate (VISTARIL) 50 mg capsule take 1 capsule by mouth every 8 hours if needed for anxiety 60 Cap 2       Past History     Past Medical History:  Past Medical History:   Diagnosis Date    Anxiety and depression     IBS (irritable bowel syndrome)     Low vitamin D level     Poor dentition     Thyroid cancer [facility])        Past Surgical History:  Past Surgical History:   Procedure Laterality Date    HX BACK SURGERY  2021    [facility]. Dr. Anup Rivero  2008    HX THYROIDECTOMY      HX TUBAL LIGATION         Family History:  Family History   Problem Relation Age of Onset    Ulcerative Colitis Mother     Diabetes Father     Hypertension Father     Elevated Lipids Father     Hypertension Maternal Grandmother     Cancer Maternal Grandmother     Cancer Maternal Grandfather     Anesth Problems Neg Hx     Clotting Disorder Neg Hx        Social History:  Social History     Tobacco Use    Smoking status: Former Smoker     Packs/day: 1.00     Types: Cigarettes     Quit date: 3/1/2021     Years since quittin.2    Smokeless tobacco: Never Used   Substance Use Topics    Alcohol use: No    Drug use: No       Allergies: Allergies   Allergen Reactions    Latex Hives    Bactrim [Sulfamethoprim] Other (comments)     unresponsive      Morphine Nausea and Vomiting         Review of Systems     Review of Systems   Constitutional: Negative for chills and fever. HENT: Negative for congestion and sore throat. Eyes: Negative for pain and redness. Respiratory: Negative for cough and shortness of breath. Cardiovascular: Negative for chest pain and leg swelling. Gastrointestinal: Negative for abdominal pain, diarrhea, nausea and vomiting. Endocrine: Negative for cold intolerance and heat intolerance. Genitourinary: Negative for dysuria, frequency, hematuria and urgency. Musculoskeletal: Positive for back pain. Negative for arthralgias and myalgias. Skin: Negative for pallor and rash. Neurological: Negative for dizziness, numbness and headaches. Psychiatric/Behavioral: Negative for agitation and dysphoric mood. Physical Exam     Physical Exam  Vitals signs and nursing note reviewed. Constitutional:       General: She is not in acute distress. Appearance: Normal appearance. She is normal weight. She is not ill-appearing or toxic-appearing. HENT:      Head: Normocephalic and atraumatic. Nose: Nose normal.      Mouth/Throat:      Mouth: Mucous membranes are moist.      Pharynx: Oropharynx is clear. Eyes:      Extraocular Movements: Extraocular movements intact. Pupils: Pupils are equal, round, and reactive to light. Neck:      Musculoskeletal: Neck supple. Cardiovascular:      Rate and Rhythm: Normal rate. Pulmonary:      Effort: Pulmonary effort is normal.   Skin:     General: Skin is warm and dry. Neurological:      General: No focal deficit present. Mental Status: She is alert and oriented to person, place, and time. Psychiatric:         Mood and Affect: Mood normal.         Behavior: Behavior normal.         Diagnostic Study Results     Labs -   No results found for this or any previous visit (from the past 12 hour(s)). Radiologic Studies -   @lastxrresult@  CT Results  (Last 48 hours)    None        CXR Results  (Last 48 hours)    None            Medical Decision Making   I am the first provider for this patient. I reviewed the vital signs, available nursing notes, past medical history, past surgical history, family history and social history. Vital Signs-Reviewed the patient's vital signs. Patient Vitals for the past 12 hrs:   Temp Resp BP SpO2   05/15/21 1055 98.3 °F (36.8 °C) 18 134/86 98 %       Records Reviewed: Nursing Notes        Provider Notes (Medical Decision Making):   Reviewed patient's records and her orthopedic surgeon sent a prescription for oxycodone to start today.  Patient called the pharmacy and confirmed that the prescription is ready to . [facility]         ED Course:   Initial assessment performed. The patients presenting problems have been discussed, and they are in agreement with the care plan formulated and outlined with them. I have encouraged them to ask questions as they arise throughout their visit. PROCEDURES  Procedures       PLAN:  1. Current Discharge Medication List        2. Follow-up Information     Follow up With Specialties Details Why Contact Info    your Orthopedic Surgeon  In 4 days as scheduled         Return to ED if worse     Diagnosis     Clinical Impression:   1.  Post-operative pain

## 2021-05-15 NOTE — ED TRIAGE NOTES
PT. TO ED WITH C/O NEED MEDICATION REFILL, UNABLE TO GET IN CONTACT WITH DOCTOR. PT. STATES HAD BACK SURGERY ON 4/26/21.

## 2021-07-29 ENCOUNTER — TRANSCRIBE ORDER (OUTPATIENT)
Dept: SCHEDULING | Age: 36
End: 2021-07-29

## 2021-07-29 DIAGNOSIS — M54.9 BACK PAIN: ICD-10-CM

## 2021-07-29 DIAGNOSIS — G95.9 DISEASE OF SPINAL CORD (HCC): Primary | ICD-10-CM

## 2021-08-01 RX ORDER — HYDROXYZINE PAMOATE 50 MG/1
CAPSULE ORAL
Qty: 60 CAPSULE | Refills: 2 | Status: SHIPPED | OUTPATIENT
Start: 2021-08-01 | End: 2021-11-01

## 2021-08-05 ENCOUNTER — HOSPITAL ENCOUNTER (OUTPATIENT)
Dept: MRI IMAGING | Age: 36
Discharge: HOME | End: 2021-08-05
Attending: ORTHOPAEDIC SURGERY
Payer: COMMERCIAL

## 2021-08-05 DIAGNOSIS — M54.9 BACK PAIN: ICD-10-CM

## 2021-08-05 DIAGNOSIS — G95.9 DISEASE OF SPINAL CORD (HCC): ICD-10-CM

## 2021-08-05 PROCEDURE — 72146 MRI CHEST SPINE W/O DYE: CPT

## 2021-08-05 PROCEDURE — 72141 MRI NECK SPINE W/O DYE: CPT

## 2021-08-08 RX ORDER — DULOXETIN HYDROCHLORIDE 60 MG/1
CAPSULE, DELAYED RELEASE ORAL
Qty: 30 CAPSULE | Refills: 5 | Status: SHIPPED | OUTPATIENT
Start: 2021-08-08 | End: 2022-02-16

## 2021-08-26 ENCOUNTER — HOSPITAL ENCOUNTER (OUTPATIENT)
Dept: PREADMISSION TESTING | Age: 36
Discharge: HOME | End: 2021-08-26
Payer: COMMERCIAL

## 2021-08-26 VITALS
DIASTOLIC BLOOD PRESSURE: 69 MMHG | HEART RATE: 90 BPM | SYSTOLIC BLOOD PRESSURE: 115 MMHG | BODY MASS INDEX: 34.15 KG/M2 | HEIGHT: 64 IN | RESPIRATION RATE: 18 BRPM | TEMPERATURE: 97.1 F | OXYGEN SATURATION: 96 % | WEIGHT: 200 LBS

## 2021-08-26 LAB
ABO + RH BLD: NORMAL
ALBUMIN SERPL-MCNC: 3.8 G/DL (ref 3.5–5)
ALBUMIN/GLOB SERPL: 1.2 {RATIO} (ref 1.1–2.2)
ALP SERPL-CCNC: 76 U/L (ref 45–117)
ALT SERPL-CCNC: 52 U/L (ref 12–78)
ANION GAP SERPL CALC-SCNC: 5 MMOL/L (ref 5–15)
APPEARANCE UR: ABNORMAL
APTT PPP: 27.4 SEC (ref 22.1–31)
AST SERPL-CCNC: 31 U/L (ref 15–37)
BACTERIA URNS QL MICRO: NEGATIVE /HPF
BASOPHILS # BLD: 0.1 K/UL (ref 0–0.1)
BASOPHILS NFR BLD: 1 % (ref 0–1)
BILIRUB SERPL-MCNC: 0.2 MG/DL (ref 0.2–1)
BILIRUB UR QL: NEGATIVE
BLOOD GROUP ANTIBODIES SERPL: NORMAL
BUN SERPL-MCNC: 8 MG/DL (ref 6–20)
BUN/CREAT SERPL: 12 (ref 12–20)
CALCIUM SERPL-MCNC: 9 MG/DL (ref 8.5–10.1)
CHLORIDE SERPL-SCNC: 105 MMOL/L (ref 97–108)
CO2 SERPL-SCNC: 26 MMOL/L (ref 21–32)
COLOR UR: ABNORMAL
CREAT SERPL-MCNC: 0.67 MG/DL (ref 0.55–1.02)
DIFFERENTIAL METHOD BLD: ABNORMAL
EOSINOPHIL # BLD: 0.3 K/UL (ref 0–0.4)
EOSINOPHIL NFR BLD: 3 % (ref 0–7)
EPITH CASTS URNS QL MICRO: ABNORMAL /LPF
ERYTHROCYTE [DISTWIDTH] IN BLOOD BY AUTOMATED COUNT: 14.6 % (ref 11.5–14.5)
EST. AVERAGE GLUCOSE BLD GHB EST-MCNC: 140 MG/DL
GLOBULIN SER CALC-MCNC: 3.1 G/DL (ref 2–4)
GLUCOSE SERPL-MCNC: 114 MG/DL (ref 65–100)
GLUCOSE UR STRIP.AUTO-MCNC: NEGATIVE MG/DL
HBA1C MFR BLD: 6.5 % (ref 4–5.6)
HCT VFR BLD AUTO: 38.3 % (ref 35–47)
HGB BLD-MCNC: 12.5 G/DL (ref 11.5–16)
HGB UR QL STRIP: NEGATIVE
HYALINE CASTS URNS QL MICRO: ABNORMAL /LPF (ref 0–5)
IMM GRANULOCYTES # BLD AUTO: 0 K/UL (ref 0–0.04)
IMM GRANULOCYTES NFR BLD AUTO: 0 % (ref 0–0.5)
INR PPP: 1 (ref 0.9–1.1)
KETONES UR QL STRIP.AUTO: NEGATIVE MG/DL
LEUKOCYTE ESTERASE UR QL STRIP.AUTO: NEGATIVE
LYMPHOCYTES # BLD: 3 K/UL (ref 0.8–3.5)
LYMPHOCYTES NFR BLD: 26 % (ref 12–49)
MCH RBC QN AUTO: 29 PG (ref 26–34)
MCHC RBC AUTO-ENTMCNC: 32.6 G/DL (ref 30–36.5)
MCV RBC AUTO: 88.9 FL (ref 80–99)
MONOCYTES # BLD: 0.6 K/UL (ref 0–1)
MONOCYTES NFR BLD: 5 % (ref 5–13)
NEUTS SEG # BLD: 7.3 K/UL (ref 1.8–8)
NEUTS SEG NFR BLD: 65 % (ref 32–75)
NITRITE UR QL STRIP.AUTO: NEGATIVE
NRBC # BLD: 0 K/UL (ref 0–0.01)
NRBC BLD-RTO: 0 PER 100 WBC
PH UR STRIP: 6.5 [PH] (ref 5–8)
PLATELET # BLD AUTO: 264 K/UL (ref 150–400)
PMV BLD AUTO: 9.1 FL (ref 8.9–12.9)
POTASSIUM SERPL-SCNC: 4.2 MMOL/L (ref 3.5–5.1)
PROT SERPL-MCNC: 6.9 G/DL (ref 6.4–8.2)
PROT UR STRIP-MCNC: NEGATIVE MG/DL
PROTHROMBIN TIME: 10.1 SEC (ref 9–11.1)
RBC # BLD AUTO: 4.31 M/UL (ref 3.8–5.2)
RBC #/AREA URNS HPF: ABNORMAL /HPF (ref 0–5)
SODIUM SERPL-SCNC: 136 MMOL/L (ref 136–145)
SP GR UR REFRACTOMETRY: 1.02 (ref 1–1.03)
SPECIMEN EXP DATE BLD: NORMAL
THERAPEUTIC RANGE,PTTT: NORMAL SECS (ref 58–77)
UA: UC IF INDICATED,UAUC: ABNORMAL
UROBILINOGEN UR QL STRIP.AUTO: 0.2 EU/DL (ref 0.2–1)
WBC # BLD AUTO: 11.3 K/UL (ref 3.6–11)
WBC URNS QL MICRO: ABNORMAL /HPF (ref 0–4)

## 2021-08-26 PROCEDURE — 85025 COMPLETE CBC W/AUTO DIFF WBC: CPT

## 2021-08-26 PROCEDURE — 85610 PROTHROMBIN TIME: CPT

## 2021-08-26 PROCEDURE — 36415 COLL VENOUS BLD VENIPUNCTURE: CPT

## 2021-08-26 PROCEDURE — 85730 THROMBOPLASTIN TIME PARTIAL: CPT

## 2021-08-26 PROCEDURE — 83036 HEMOGLOBIN GLYCOSYLATED A1C: CPT

## 2021-08-26 PROCEDURE — 93005 ELECTROCARDIOGRAM TRACING: CPT

## 2021-08-26 PROCEDURE — 81001 URINALYSIS AUTO W/SCOPE: CPT

## 2021-08-26 PROCEDURE — 80053 COMPREHEN METABOLIC PANEL: CPT

## 2021-08-26 PROCEDURE — 86901 BLOOD TYPING SEROLOGIC RH(D): CPT

## 2021-08-26 RX ORDER — GABAPENTIN 400 MG/1
400 CAPSULE ORAL 3 TIMES DAILY
COMMUNITY

## 2021-08-26 RX ORDER — OXYCODONE AND ACETAMINOPHEN 7.5; 325 MG/1; MG/1
1 TABLET ORAL
COMMUNITY
End: 2021-09-08

## 2021-08-26 NOTE — PERIOP NOTES
[address] [facility] [address], [address] [facility]   MAIN OR                                  [phone]   MAIN PRE OP                          [phone]                                                                                AMBULATORY PRE OP          [phone]  PRE-ADMISSION TESTING    [phone]   Surgery Date: Tuesday, 9/7/21       Is surgery arrival time given by surgeon? YES  NO  If NO, [address] staff will call you between 3 and 7pm the day before your surgery with your arrival time. (If your surgery is on a Monday, we will call you the Friday before.)    Call [phone] after 7pm Monday-Friday if you did not receive this call. INSTRUCTIONS BEFORE YOUR SURGERY   When You  Arrive Arrive at the [address] [facility] on the day of your surgery  Have your insurance card, photo ID, and any copayment (if needed)   Food   and   Drink NO food or drink after midnight the night before surgery    This means NO water, gum, mints, coffee, juice, etc.  No alcohol (beer, wine, liquor) 24 hours before and after surgery   Medications to   TAKE   Morning of Surgery MEDICATIONS TO TAKE THE MORNING OF SURGERY WITH A SIP OF WATER:    Unithroid   Gabapentin   Vistaril   Cymbalta   Oxycodone -(if needed)   Medications  To  STOP      7 days before surgery  Non-Steroidal anti-inflammatory Drugs (NSAID's): for example, Ibuprofen (Advil, Motrin), Naproxen (Aleve)   Aspirin, if taking for pain    Herbal supplements, vitamins, and fish oil   Other:  (Pain medications not listed above, including Tylenol may be taken)  Please stop taking vitamins after 8/31 until surgery. Blood  Thinners  If you take  Aspirin, Plavix, Coumadin, or any blood-thinning or anti-blood clot medicine, talk to the doctor who prescribed the medications for pre-operative instructions.    Bathing Clothing  Jewelry  Valuables      If you shower the morning of surgery, please do not apply anything to your skin (lotions, powders, deodorant, or makeup, especially mascara)   Follow Chlorhexidine Care Fusion body wash instructions provided to you during PAT appointment. Begin 3 days prior to surgery.  Do not shave or trim anywhere 24 hours before surgery   Wear your hair loose or down; no pony-tails, buns, or metal hair clips   Wear loose, comfortable, clean clothes   Wear glasses instead of contacts  [employer] money, valuables, and jewelry, including body piercings, at home   If you were given an iContact, bring it on day of surgery. Going Home - or Spending the Night  SAME-DAY SURGERY: You must have a responsible adult drive you home and stay with you 24 hours after surgery   ADMITS: If your doctor is keeping you in the hospital after surgery, leave personal belongings/luggage in your car until you have a hospital room number. Hospital discharge time is 12 noon  Drivers must be here before 12 noon unless you are told differently   Special Instructions   Special Instructions:  · Use Chlorhexidine Care Fusion wash and sponges 3 days prior to surgery as instructed. · Incentive spirometer given with instructions to practice at home and bring back to the hospital on the day of surgery. · [facility] will contact you if your Hemoglobin A1C is greater than 7.5. · Ensure/Glucerna  sample, nutritional information, and Ensure/Glucerna coupon given. · Pain pamphlet and Call Don't Fall reminder reviewed with patient. ·  parking is complimentary Monday - Friday 7 am - 5 pm  · Bring PTA Medication list day of surgery with the last doses taken documented  It is now mandated that all surgical patients be tested for COVID-19 prior to surgery. Testing has to be exactly 4 days prior to surgery.        Your COVID test date is Friday, 9/3/21 between 8:00 am and 11:00 am.       COVID testing will be performed curbside at the [address]. There will be signs leading you to the testing site. You will need to bring a photo ID with you to be swabbed. Patients are advised to self-quarantine at home after testing and prior to your surgery date. You will be notified if your results are positive. What to watch for:  Coronavirus (COVID-19) affects different people in different ways  It also appears with a wide range of symptoms from mild to severe  Signs usually appear 2-14 days after exposure    If you develop any of the following, notify your doctor immediately:  Fever  Chills, with or without a shiver  Muscle pain  Headache  Sore throat  Dry cough  New loss of taste or smell  Tiredness     If you develop any of the following, call [phone]:  Shortness of breath  Difficulty breathing  Chest pain  New confusion  Blueness of fingers and/or lips       Follow all instructions so your surgery wont be cancelled. Please, be on time. If a situation occurs and you are delayed the day of surgery, call [phone]  . If your physical condition changes (like a fever, cold, flu, etc.) call your surgeon. Home medication(s) reviewed and verified via      LIST   during PAT appointment. The patient was contacted by   IN-PERSON  The patient verbalizes understanding of all instructions and   DOES NOT   need reinforcement.
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
## 2021-08-27 LAB
ATRIAL RATE: 87 BPM
BACTERIA SPEC CULT: NORMAL
BACTERIA SPEC CULT: NORMAL
CALCULATED P AXIS, ECG09: 62 DEGREES
CALCULATED R AXIS, ECG10: 47 DEGREES
CALCULATED T AXIS, ECG11: 72 DEGREES
DIAGNOSIS, 93000: NORMAL
P-R INTERVAL, ECG05: 134 MS
Q-T INTERVAL, ECG07: 380 MS
QRS DURATION, ECG06: 86 MS
QTC CALCULATION (BEZET), ECG08: 457 MS
SERVICE CMNT-IMP: NORMAL
VENTRICULAR RATE, ECG03: 87 BPM

## 2021-09-03 ENCOUNTER — ANESTHESIA EVENT (OUTPATIENT)
Dept: SURGERY | Age: 36
End: 2021-09-03
Payer: COMMERCIAL

## 2021-09-03 ENCOUNTER — HOSPITAL ENCOUNTER (OUTPATIENT)
Dept: PREADMISSION TESTING | Age: 36
Discharge: HOME | End: 2021-09-03
Payer: COMMERCIAL

## 2021-09-03 PROCEDURE — U0005 INFEC AGEN DETEC AMPLI PROBE: HCPCS

## 2021-09-04 LAB
SARS-COV-2, XPLCVT: NOT DETECTED
SOURCE, COVRS: NORMAL

## 2021-09-06 NOTE — ANESTHESIA PREPROCEDURE EVALUATION
Relevant Problems   ENDOCRINE   (+) Postoperative hypothyroidism      PERSONAL HX & FAMILY HX OF CANCER   (+) Thyroid cancer (HCC)       Anesthetic History   No history of anesthetic complications            Review of Systems / Medical History  Patient summary reviewed, nursing notes reviewed and pertinent labs reviewed    Pulmonary  Within defined limits                 Neuro/Psych         Psychiatric history    Comments: Chronic neck and back pain  Daily pain meds Cardiovascular  Within defined limits                     GI/Hepatic/Renal  Within defined limits              Endo/Other      Hypothyroidism  Cancer (thyrooid)     Other Findings   Comments: Lumbar fusion surgery 4/21, easy airway         Physical Exam    Airway  Mallampati: II  TM Distance: 4 - 6 cm  Neck ROM: normal range of motion   Mouth opening: Normal     Cardiovascular    Rhythm: regular  Rate: normal         Dental         Pulmonary  Breath sounds clear to auscultation               Abdominal  GI exam deferred       Other Findings            Anesthetic Plan    ASA: 2  Anesthesia type: general          Induction: Intravenous  Anesthetic plan and risks discussed with: Patient

## 2021-09-07 ENCOUNTER — HOSPITAL ENCOUNTER (OUTPATIENT)
Age: 36
Discharge: HOME | End: 2021-09-08
Attending: ORTHOPAEDIC SURGERY | Admitting: ORTHOPAEDIC SURGERY
Payer: COMMERCIAL

## 2021-09-07 ENCOUNTER — ANESTHESIA (OUTPATIENT)
Dept: SURGERY | Age: 36
End: 2021-09-07
Payer: COMMERCIAL

## 2021-09-07 ENCOUNTER — APPOINTMENT (OUTPATIENT)
Dept: GENERAL RADIOLOGY | Age: 36
End: 2021-09-07
Attending: ORTHOPAEDIC SURGERY
Payer: COMMERCIAL

## 2021-09-07 DIAGNOSIS — M48.02 CERVICAL STENOSIS OF SPINAL CANAL: Primary | ICD-10-CM

## 2021-09-07 PROCEDURE — 74011250636 HC RX REV CODE- 250/636: Performed by: ANESTHESIOLOGY

## 2021-09-07 PROCEDURE — 74011250637 HC RX REV CODE- 250/637: Performed by: ORTHOPAEDIC SURGERY

## 2021-09-07 PROCEDURE — 77030004391 HC BUR FLUT MEDT -C: Performed by: ORTHOPAEDIC SURGERY

## 2021-09-07 PROCEDURE — 77030038692 HC WND DEB SYS IRMX -B: Performed by: ORTHOPAEDIC SURGERY

## 2021-09-07 PROCEDURE — C1713 ANCHOR/SCREW BN/BN,TIS/BN: HCPCS | Performed by: ORTHOPAEDIC SURGERY

## 2021-09-07 PROCEDURE — 74011000250 HC RX REV CODE- 250: Performed by: ANESTHESIOLOGY

## 2021-09-07 PROCEDURE — 74011000250 HC RX REV CODE- 250: Performed by: ORTHOPAEDIC SURGERY

## 2021-09-07 PROCEDURE — 77030040922 HC BLNKT HYPOTHRM STRY -A

## 2021-09-07 PROCEDURE — C1821 INTERSPINOUS IMPLANT: HCPCS | Performed by: ORTHOPAEDIC SURGERY

## 2021-09-07 PROCEDURE — 77030028271 HC SRGFL HEMSTAT MTRX KT J&J -C: Performed by: ORTHOPAEDIC SURGERY

## 2021-09-07 PROCEDURE — 74011250636 HC RX REV CODE- 250/636: Performed by: PHYSICIAN ASSISTANT

## 2021-09-07 PROCEDURE — 77030040361 HC SLV COMPR DVT MDII -B

## 2021-09-07 PROCEDURE — 74011250636 HC RX REV CODE- 250/636: Performed by: ORTHOPAEDIC SURGERY

## 2021-09-07 PROCEDURE — 77030008684 HC TU ET CUF COVD -B: Performed by: ANESTHESIOLOGY

## 2021-09-07 PROCEDURE — 76210000017 HC OR PH I REC 1.5 TO 2 HR: Performed by: ORTHOPAEDIC SURGERY

## 2021-09-07 PROCEDURE — 76010000172 HC OR TIME 2.5 TO 3 HR INTENSV-TIER 1: Performed by: ORTHOPAEDIC SURGERY

## 2021-09-07 PROCEDURE — 77030040465 HC GRFT MATRIX VIBONE REGE -H: Performed by: ORTHOPAEDIC SURGERY

## 2021-09-07 PROCEDURE — 77030031139 HC SUT VCRL2 J&J -A: Performed by: ORTHOPAEDIC SURGERY

## 2021-09-07 PROCEDURE — 76060000036 HC ANESTHESIA 2.5 TO 3 HR: Performed by: ORTHOPAEDIC SURGERY

## 2021-09-07 PROCEDURE — 77030012406 HC DRN WND PENRS BARD -A: Performed by: ORTHOPAEDIC SURGERY

## 2021-09-07 PROCEDURE — 77030002933 HC SUT MCRYL J&J -A: Performed by: ORTHOPAEDIC SURGERY

## 2021-09-07 PROCEDURE — 77030034475 HC MISC IMPL SPN: Performed by: ORTHOPAEDIC SURGERY

## 2021-09-07 PROCEDURE — 2709999900 HC NON-CHARGEABLE SUPPLY: Performed by: ORTHOPAEDIC SURGERY

## 2021-09-07 DEVICE — GRAFT BNE SUB 5CC VIABLE MTRX VIBNE: Type: IMPLANTABLE DEVICE | Site: SPINE CERVICAL | Status: FUNCTIONAL

## 2021-09-07 DEVICE — IMPLANTABLE DEVICE: Type: IMPLANTABLE DEVICE | Site: SPINE CERVICAL | Status: FUNCTIONAL

## 2021-09-07 DEVICE — PLATE SPNL 2 LEVEL 36 MM ANTR CERV CONSTRN PYRENESS: Type: IMPLANTABLE DEVICE | Site: SPINE CERVICAL | Status: FUNCTIONAL

## 2021-09-07 DEVICE — ADVANCED BONE GRAFT SUBSTITUTE 5CC
Type: IMPLANTABLE DEVICE | Site: SPINE CERVICAL | Status: FUNCTIONAL
Brand: NANOSS ABGS

## 2021-09-07 DEVICE — SCREW SPNL L14MM DIA4MM CERV ST CONSTRN PYRENEES: Type: IMPLANTABLE DEVICE | Site: SPINE CERVICAL | Status: FUNCTIONAL

## 2021-09-07 RX ORDER — SODIUM CHLORIDE, SODIUM LACTATE, POTASSIUM CHLORIDE, CALCIUM CHLORIDE 600; 310; 30; 20 MG/100ML; MG/100ML; MG/100ML; MG/100ML
INJECTION, SOLUTION INTRAVENOUS
Status: DISCONTINUED | OUTPATIENT
Start: 2021-09-07 | End: 2021-09-07 | Stop reason: HOSPADM

## 2021-09-07 RX ORDER — FACIAL-BODY WIPES
10 EACH TOPICAL DAILY PRN
Status: DISCONTINUED | OUTPATIENT
Start: 2021-09-09 | End: 2021-09-08 | Stop reason: HOSPADM

## 2021-09-07 RX ORDER — MIDAZOLAM HYDROCHLORIDE 1 MG/ML
INJECTION, SOLUTION INTRAMUSCULAR; INTRAVENOUS AS NEEDED
Status: DISCONTINUED | OUTPATIENT
Start: 2021-09-07 | End: 2021-09-07 | Stop reason: HOSPADM

## 2021-09-07 RX ORDER — DIPHENHYDRAMINE HYDROCHLORIDE 50 MG/ML
12.5 INJECTION, SOLUTION INTRAMUSCULAR; INTRAVENOUS AS NEEDED
Status: DISCONTINUED | OUTPATIENT
Start: 2021-09-07 | End: 2021-09-07 | Stop reason: HOSPADM

## 2021-09-07 RX ORDER — SODIUM CHLORIDE 0.9 % (FLUSH) 0.9 %
5-40 SYRINGE (ML) INJECTION EVERY 8 HOURS
Status: DISCONTINUED | OUTPATIENT
Start: 2021-09-07 | End: 2021-09-08 | Stop reason: HOSPADM

## 2021-09-07 RX ORDER — ACETAMINOPHEN 325 MG/1
650 TABLET ORAL
Status: DISCONTINUED | OUTPATIENT
Start: 2021-09-07 | End: 2021-09-08 | Stop reason: HOSPADM

## 2021-09-07 RX ORDER — FAMOTIDINE 20 MG/1
20 TABLET, FILM COATED ORAL 2 TIMES DAILY
Status: DISCONTINUED | OUTPATIENT
Start: 2021-09-07 | End: 2021-09-08 | Stop reason: HOSPADM

## 2021-09-07 RX ORDER — LEVOTHYROXINE SODIUM 150 UG/1
150 TABLET ORAL
Status: DISCONTINUED | OUTPATIENT
Start: 2021-09-08 | End: 2021-09-08 | Stop reason: HOSPADM

## 2021-09-07 RX ORDER — NEOSTIGMINE METHYLSULFATE 1 MG/ML
INJECTION, SOLUTION INTRAVENOUS AS NEEDED
Status: DISCONTINUED | OUTPATIENT
Start: 2021-09-07 | End: 2021-09-07 | Stop reason: HOSPADM

## 2021-09-07 RX ORDER — NALOXONE HYDROCHLORIDE 0.4 MG/ML
0.4 INJECTION, SOLUTION INTRAMUSCULAR; INTRAVENOUS; SUBCUTANEOUS AS NEEDED
Status: DISCONTINUED | OUTPATIENT
Start: 2021-09-07 | End: 2021-09-08 | Stop reason: HOSPADM

## 2021-09-07 RX ORDER — FENTANYL CITRATE 50 UG/ML
25 INJECTION, SOLUTION INTRAMUSCULAR; INTRAVENOUS
Status: DISCONTINUED | OUTPATIENT
Start: 2021-09-07 | End: 2021-09-07

## 2021-09-07 RX ORDER — ONDANSETRON 2 MG/ML
4 INJECTION INTRAMUSCULAR; INTRAVENOUS
Status: ACTIVE | OUTPATIENT
Start: 2021-09-07 | End: 2021-09-08

## 2021-09-07 RX ORDER — PROPOFOL 10 MG/ML
INJECTION, EMULSION INTRAVENOUS AS NEEDED
Status: DISCONTINUED | OUTPATIENT
Start: 2021-09-07 | End: 2021-09-07 | Stop reason: HOSPADM

## 2021-09-07 RX ORDER — DEXAMETHASONE SODIUM PHOSPHATE 4 MG/ML
INJECTION, SOLUTION INTRA-ARTICULAR; INTRALESIONAL; INTRAMUSCULAR; INTRAVENOUS; SOFT TISSUE AS NEEDED
Status: DISCONTINUED | OUTPATIENT
Start: 2021-09-07 | End: 2021-09-07 | Stop reason: HOSPADM

## 2021-09-07 RX ORDER — SODIUM CHLORIDE, SODIUM LACTATE, POTASSIUM CHLORIDE, CALCIUM CHLORIDE 600; 310; 30; 20 MG/100ML; MG/100ML; MG/100ML; MG/100ML
125 INJECTION, SOLUTION INTRAVENOUS CONTINUOUS
Status: DISCONTINUED | OUTPATIENT
Start: 2021-09-07 | End: 2021-09-07 | Stop reason: HOSPADM

## 2021-09-07 RX ORDER — OXYCODONE HYDROCHLORIDE 5 MG/1
10 TABLET ORAL
Status: DISCONTINUED | OUTPATIENT
Start: 2021-09-07 | End: 2021-09-08 | Stop reason: HOSPADM

## 2021-09-07 RX ORDER — FLUMAZENIL 0.1 MG/ML
0.2 INJECTION INTRAVENOUS
Status: DISCONTINUED | OUTPATIENT
Start: 2021-09-07 | End: 2021-09-07 | Stop reason: HOSPADM

## 2021-09-07 RX ORDER — ONDANSETRON 2 MG/ML
INJECTION INTRAMUSCULAR; INTRAVENOUS AS NEEDED
Status: DISCONTINUED | OUTPATIENT
Start: 2021-09-07 | End: 2021-09-07 | Stop reason: HOSPADM

## 2021-09-07 RX ORDER — ROCURONIUM BROMIDE 10 MG/ML
INJECTION, SOLUTION INTRAVENOUS AS NEEDED
Status: DISCONTINUED | OUTPATIENT
Start: 2021-09-07 | End: 2021-09-07 | Stop reason: HOSPADM

## 2021-09-07 RX ORDER — DEXAMETHASONE SODIUM PHOSPHATE 4 MG/ML
10 INJECTION, SOLUTION INTRA-ARTICULAR; INTRALESIONAL; INTRAMUSCULAR; INTRAVENOUS; SOFT TISSUE ONCE
Status: COMPLETED | OUTPATIENT
Start: 2021-09-07 | End: 2021-09-07

## 2021-09-07 RX ORDER — POLYETHYLENE GLYCOL 3350 17 G/17G
17 POWDER, FOR SOLUTION ORAL DAILY
Status: DISCONTINUED | OUTPATIENT
Start: 2021-09-08 | End: 2021-09-08 | Stop reason: HOSPADM

## 2021-09-07 RX ORDER — HYDROMORPHONE HYDROCHLORIDE 1 MG/ML
0.5 INJECTION, SOLUTION INTRAMUSCULAR; INTRAVENOUS; SUBCUTANEOUS
Status: ACTIVE | OUTPATIENT
Start: 2021-09-07 | End: 2021-09-08

## 2021-09-07 RX ORDER — GLYCOPYRROLATE 0.2 MG/ML
INJECTION INTRAMUSCULAR; INTRAVENOUS AS NEEDED
Status: DISCONTINUED | OUTPATIENT
Start: 2021-09-07 | End: 2021-09-07 | Stop reason: HOSPADM

## 2021-09-07 RX ORDER — DIPHENHYDRAMINE HCL 25 MG
25 CAPSULE ORAL EVERY EVENING
Status: DISCONTINUED | OUTPATIENT
Start: 2021-09-07 | End: 2021-09-08 | Stop reason: HOSPADM

## 2021-09-07 RX ORDER — SODIUM CHLORIDE 9 MG/ML
125 INJECTION, SOLUTION INTRAVENOUS CONTINUOUS
Status: DISPENSED | OUTPATIENT
Start: 2021-09-07 | End: 2021-09-08

## 2021-09-07 RX ORDER — CYCLOBENZAPRINE HCL 10 MG
10 TABLET ORAL
Status: DISCONTINUED | OUTPATIENT
Start: 2021-09-07 | End: 2021-09-08 | Stop reason: HOSPADM

## 2021-09-07 RX ORDER — OXYCODONE HYDROCHLORIDE 5 MG/1
5 TABLET ORAL
Status: DISCONTINUED | OUTPATIENT
Start: 2021-09-07 | End: 2021-09-08 | Stop reason: HOSPADM

## 2021-09-07 RX ORDER — DIPHENHYDRAMINE HYDROCHLORIDE 50 MG/ML
12.5 INJECTION, SOLUTION INTRAMUSCULAR; INTRAVENOUS
Status: DISCONTINUED | OUTPATIENT
Start: 2021-09-07 | End: 2021-09-08 | Stop reason: HOSPADM

## 2021-09-07 RX ORDER — FENTANYL CITRATE 50 UG/ML
INJECTION, SOLUTION INTRAMUSCULAR; INTRAVENOUS AS NEEDED
Status: DISCONTINUED | OUTPATIENT
Start: 2021-09-07 | End: 2021-09-07 | Stop reason: HOSPADM

## 2021-09-07 RX ORDER — DULOXETIN HYDROCHLORIDE 30 MG/1
60 CAPSULE, DELAYED RELEASE ORAL DAILY
Status: DISCONTINUED | OUTPATIENT
Start: 2021-09-08 | End: 2021-09-08 | Stop reason: HOSPADM

## 2021-09-07 RX ORDER — SODIUM CHLORIDE 0.9 % (FLUSH) 0.9 %
5-40 SYRINGE (ML) INJECTION AS NEEDED
Status: DISCONTINUED | OUTPATIENT
Start: 2021-09-07 | End: 2021-09-08 | Stop reason: HOSPADM

## 2021-09-07 RX ORDER — KETAMINE HYDROCHLORIDE 10 MG/ML
INJECTION, SOLUTION INTRAMUSCULAR; INTRAVENOUS AS NEEDED
Status: DISCONTINUED | OUTPATIENT
Start: 2021-09-07 | End: 2021-09-07 | Stop reason: HOSPADM

## 2021-09-07 RX ORDER — HYDROMORPHONE HYDROCHLORIDE 1 MG/ML
.25-1 INJECTION, SOLUTION INTRAMUSCULAR; INTRAVENOUS; SUBCUTANEOUS
Status: DISCONTINUED | OUTPATIENT
Start: 2021-09-07 | End: 2021-09-07 | Stop reason: HOSPADM

## 2021-09-07 RX ORDER — FENTANYL CITRATE 50 UG/ML
50 INJECTION, SOLUTION INTRAMUSCULAR; INTRAVENOUS
Status: DISCONTINUED | OUTPATIENT
Start: 2021-09-07 | End: 2021-09-07 | Stop reason: HOSPADM

## 2021-09-07 RX ORDER — AMOXICILLIN 250 MG
1 CAPSULE ORAL 2 TIMES DAILY
Status: DISCONTINUED | OUTPATIENT
Start: 2021-09-08 | End: 2021-09-08 | Stop reason: HOSPADM

## 2021-09-07 RX ORDER — NALOXONE HYDROCHLORIDE 0.4 MG/ML
0.2 INJECTION, SOLUTION INTRAMUSCULAR; INTRAVENOUS; SUBCUTANEOUS
Status: DISCONTINUED | OUTPATIENT
Start: 2021-09-07 | End: 2021-09-07 | Stop reason: HOSPADM

## 2021-09-07 RX ORDER — LIDOCAINE HYDROCHLORIDE 10 MG/ML
0.1 INJECTION, SOLUTION EPIDURAL; INFILTRATION; INTRACAUDAL; PERINEURAL AS NEEDED
Status: DISCONTINUED | OUTPATIENT
Start: 2021-09-07 | End: 2021-09-07 | Stop reason: HOSPADM

## 2021-09-07 RX ORDER — HYDROMORPHONE HCL/0.9% NACL/PF 0.5 MG/ML
PLASTIC BAG, INJECTION (ML) INTRAVENOUS
Status: DISPENSED | OUTPATIENT
Start: 2021-09-07 | End: 2021-09-08

## 2021-09-07 RX ADMIN — SODIUM CHLORIDE, POTASSIUM CHLORIDE, SODIUM LACTATE AND CALCIUM CHLORIDE: 600; 310; 30; 20 INJECTION, SOLUTION INTRAVENOUS at 07:35

## 2021-09-07 RX ADMIN — DIPHENHYDRAMINE HYDROCHLORIDE 25 MG: 25 CAPSULE ORAL at 17:50

## 2021-09-07 RX ADMIN — FENTANYL CITRATE 50 MCG: 50 INJECTION, SOLUTION INTRAMUSCULAR; INTRAVENOUS at 09:26

## 2021-09-07 RX ADMIN — FENTANYL CITRATE 50 MCG: 50 INJECTION, SOLUTION INTRAMUSCULAR; INTRAVENOUS at 08:38

## 2021-09-07 RX ADMIN — FENTANYL CITRATE 100 MCG: 50 INJECTION, SOLUTION INTRAMUSCULAR; INTRAVENOUS at 08:47

## 2021-09-07 RX ADMIN — GABAPENTIN 400 MG: 100 CAPSULE ORAL at 20:03

## 2021-09-07 RX ADMIN — CEFAZOLIN 2 G: 1 INJECTION, POWDER, FOR SOLUTION INTRAMUSCULAR; INTRAVENOUS at 23:24

## 2021-09-07 RX ADMIN — ONDANSETRON HYDROCHLORIDE 4 MG: 2 SOLUTION INTRAMUSCULAR; INTRAVENOUS at 10:44

## 2021-09-07 RX ADMIN — GLYCOPYRROLATE 0.6 MG: 0.2 INJECTION INTRAMUSCULAR; INTRAVENOUS at 10:48

## 2021-09-07 RX ADMIN — DIPHENHYDRAMINE HYDROCHLORIDE 12.5 MG: 50 INJECTION INTRAMUSCULAR; INTRAVENOUS at 13:41

## 2021-09-07 RX ADMIN — PROPOFOL 200 MG: 10 INJECTION, EMULSION INTRAVENOUS at 08:47

## 2021-09-07 RX ADMIN — CEFAZOLIN 2 G: 1 INJECTION, POWDER, FOR SOLUTION INTRAMUSCULAR; INTRAVENOUS at 15:37

## 2021-09-07 RX ADMIN — CYCLOBENZAPRINE 10 MG: 10 TABLET, FILM COATED ORAL at 13:41

## 2021-09-07 RX ADMIN — FENTANYL CITRATE 50 MCG: 50 INJECTION, SOLUTION INTRAMUSCULAR; INTRAVENOUS at 12:16

## 2021-09-07 RX ADMIN — SODIUM CHLORIDE, POTASSIUM CHLORIDE, SODIUM LACTATE AND CALCIUM CHLORIDE: 600; 310; 30; 20 INJECTION, SOLUTION INTRAVENOUS at 10:43

## 2021-09-07 RX ADMIN — KETAMINE HYDROCHLORIDE 30 MG: 10 INJECTION INTRAMUSCULAR; INTRAVENOUS at 09:08

## 2021-09-07 RX ADMIN — OXYCODONE 10 MG: 5 TABLET ORAL at 16:54

## 2021-09-07 RX ADMIN — HYDROMORPHONE HYDROCHLORIDE 0.5 MG: 1 INJECTION, SOLUTION INTRAMUSCULAR; INTRAVENOUS; SUBCUTANEOUS at 11:16

## 2021-09-07 RX ADMIN — Medication 3 MG: at 10:48

## 2021-09-07 RX ADMIN — OXYCODONE 10 MG: 5 TABLET ORAL at 23:24

## 2021-09-07 RX ADMIN — FAMOTIDINE 20 MG: 20 TABLET ORAL at 20:03

## 2021-09-07 RX ADMIN — KETAMINE HYDROCHLORIDE 10 MG: 10 INJECTION INTRAMUSCULAR; INTRAVENOUS at 10:39

## 2021-09-07 RX ADMIN — GABAPENTIN 400 MG: 100 CAPSULE ORAL at 12:37

## 2021-09-07 RX ADMIN — SODIUM CHLORIDE, SODIUM LACTATE, POTASSIUM CHLORIDE, CALCIUM CHLORIDE: 600; 310; 30; 20 INJECTION, SOLUTION INTRAVENOUS at 08:58

## 2021-09-07 RX ADMIN — Medication: at 12:21

## 2021-09-07 RX ADMIN — FENTANYL CITRATE 50 MCG: 50 INJECTION, SOLUTION INTRAMUSCULAR; INTRAVENOUS at 12:01

## 2021-09-07 RX ADMIN — HYDROMORPHONE HYDROCHLORIDE 0.5 MG: 1 INJECTION, SOLUTION INTRAMUSCULAR; INTRAVENOUS; SUBCUTANEOUS at 11:27

## 2021-09-07 RX ADMIN — DEXAMETHASONE SODIUM PHOSPHATE 8 MG: 4 INJECTION, SOLUTION INTRAMUSCULAR; INTRAVENOUS at 09:36

## 2021-09-07 RX ADMIN — HYDROMORPHONE HYDROCHLORIDE 0.5 MG: 1 INJECTION, SOLUTION INTRAMUSCULAR; INTRAVENOUS; SUBCUTANEOUS at 11:48

## 2021-09-07 RX ADMIN — ROCURONIUM BROMIDE 10 MG: 10 INJECTION INTRAVENOUS at 09:30

## 2021-09-07 RX ADMIN — CEFAZOLIN SODIUM 2 G: 1 POWDER, FOR SOLUTION INTRAMUSCULAR; INTRAVENOUS at 09:04

## 2021-09-07 RX ADMIN — SODIUM CHLORIDE, POTASSIUM CHLORIDE, SODIUM LACTATE AND CALCIUM CHLORIDE 125 ML/HR: 600; 310; 30; 20 INJECTION, SOLUTION INTRAVENOUS at 07:12

## 2021-09-07 RX ADMIN — FAMOTIDINE 20 MG: 20 TABLET ORAL at 15:37

## 2021-09-07 RX ADMIN — ROCURONIUM BROMIDE 10 MG: 10 INJECTION INTRAVENOUS at 09:26

## 2021-09-07 RX ADMIN — PROPOFOL 180 MCG/KG/MIN: 10 INJECTION, EMULSION INTRAVENOUS at 08:50

## 2021-09-07 RX ADMIN — CYCLOBENZAPRINE 10 MG: 10 TABLET, FILM COATED ORAL at 20:03

## 2021-09-07 RX ADMIN — DEXAMETHASONE SODIUM PHOSPHATE 10 MG: 4 INJECTION, SOLUTION INTRAMUSCULAR; INTRAVENOUS at 17:49

## 2021-09-07 RX ADMIN — SODIUM CHLORIDE 125 ML/HR: 9 INJECTION, SOLUTION INTRAVENOUS at 20:04

## 2021-09-07 RX ADMIN — SODIUM CHLORIDE 125 ML/HR: 9 INJECTION, SOLUTION INTRAVENOUS at 12:03

## 2021-09-07 RX ADMIN — FENTANYL CITRATE 50 MCG: 50 INJECTION, SOLUTION INTRAMUSCULAR; INTRAVENOUS at 09:03

## 2021-09-07 RX ADMIN — HYDROMORPHONE HYDROCHLORIDE 0.5 MG: 1 INJECTION, SOLUTION INTRAMUSCULAR; INTRAVENOUS; SUBCUTANEOUS at 11:38

## 2021-09-07 RX ADMIN — ROCURONIUM BROMIDE 50 MG: 10 INJECTION INTRAVENOUS at 08:47

## 2021-09-07 RX ADMIN — KETAMINE HYDROCHLORIDE 10 MG: 10 INJECTION INTRAMUSCULAR; INTRAVENOUS at 10:15

## 2021-09-07 RX ADMIN — MIDAZOLAM 2 MG: 1 INJECTION, SOLUTION INTRAMUSCULAR; INTRAVENOUS at 08:38

## 2021-09-07 NOTE — PERIOP NOTES
Spoke to patients family Pamila Angry and gave him an update on patient condition, any and all questions answered.  Will continue to monitor pt

## 2021-09-07 NOTE — OP NOTES
[address] [facility]  [address], [address] [facility] [place]    OPERATIVE REPORT      NAME: Selene Phelps    AGE: 37 y.o. YOB: 1985    MEDICAL RECORD NUMBER: [id number]    DATE OF SURGERY: 9/7/2021    OPERATIVE REPORT     PREOPERATIVE DIAGNOSIS: Cervical stenosis     POSTOPERATIVE DIAGNOSIS: Cervical stenosis    OPERATIVE PROCEDURE:   1. C6 corpectomy  2. Anterior cervical interbody fusion C5-C6 and C6-C7  3. Anterior instrumentation C5-C7  3. Application of interbody spacer from C5 to C7     SURGEON: Suresh Soto MD     ASSISTANT: FRACISCO Rizzo    Specimens - no    ANESTHESIA: General    COMPLICATIONS: None    ESTIMATED BLOOD LOSS: 50 cc    INSTRUMENTATION: K2M plate, Seaspine spacer    NEUROMONITORING: SSEPs and spontaneous EMGs    INDICATION FOR PROCEDURE: The patient is a very pleasant 37 y.o. female with cervical stenosis. The patient elected to proceed with operative intervention. She was aware of the risks, benefits, and alternatives. She provided informed consent. PROCEDURE: The patient was identified in the preoperative holding area. The anterior cervical spine was marked by me. She was transferred to the operating room where general anesthesia was given. She was also given perioperative ancef antibiotics. The patient was placed supine on the operating room table. All bony prominences were well-padded. The shoulders were taped. The anterior cervical spine was prepped and draped in the usual standard fashion. We performed a surgical time-out. I made a skin incision on the left side. It was transverse. I exposed the anterior cervical spine. I placed a needle into the disk space to verify our levels. I exposed the disc spaces with electrocautery from uncus to uncus. I brought in the operating room microscope. I performed a complete corpectomy of C6. I decompressed the spinal cord and nerve roots bilaterally. I prepared the endplates to bleeding bone. We had good hemostasis. I performed trial sizing. I placed a spacer from C5 to C7 for the interbody fusion of C5-C6 and C6-C7. The spacer was filled with allograft    I then placed an anterior cervical plate into C5 and C7. The screws were locked to the plate according to the manufacture's specification. We had good hemostasis. I copiously irrigated the entire wound. I placed a deep drain. The wound was closed with 3-0 Vicryl and 4-0 Monocryl. A sterile dressing was applied. The patient was extubated and transferred to the recovery room in good medical condition. The PA assisted with retraction and wound closure    I, Dr. Anna Montalvo, performed the above procedures.      Anna Montalvo MD  9/7/2021

## 2021-09-07 NOTE — PERIOP NOTES
Patient continues to c/o left and right upper extremity tingling. Patient medicated with 2 mg total of IV Dilaudid and also 100 mcg of Fentanyl IV. Patient takes regularly scheduled Gabapentin 400 mg TID, given a one time dose of 400 mg. Will continue to monitor patient.

## 2021-09-07 NOTE — PERIOP NOTES
Report given to Sharda Gilbert RN using [employer]. Patient transferred to room 14 in the pre-op area for further medical management.

## 2021-09-07 NOTE — H&P
Date of Surgery Update:  Jerson Brain was seen and examined. History and physical has been reviewed. The patient has been examined.  There have been no significant clinical changes since the completion of the originally dated History and Physical.    Signed By: Severiano Basil, MD     September 7, 2021 7:29 AM

## 2021-09-07 NOTE — PERIOP NOTES
TRANSFER - IN REPORT:    Verbal report received from Jules Xiong RN(name) on Ladd Babinski  being received from Pre-op14(unit) for routine post - op      Report consisted of patients Situation, Background, Assessment and   Recommendations(SBAR). Information from the following report(s) SBAR, MAR and Cardiac Rhythm NSR was reviewed with the receiving nurse. Opportunity for questions and clarification was provided. Assessment completed upon patients arrival to unit and care assumed.

## 2021-09-08 VITALS
HEART RATE: 85 BPM | OXYGEN SATURATION: 94 % | RESPIRATION RATE: 20 BRPM | SYSTOLIC BLOOD PRESSURE: 118 MMHG | DIASTOLIC BLOOD PRESSURE: 79 MMHG | TEMPERATURE: 98.8 F

## 2021-09-08 LAB
ANION GAP SERPL CALC-SCNC: 5 MMOL/L (ref 5–15)
BUN SERPL-MCNC: 8 MG/DL (ref 6–20)
BUN/CREAT SERPL: 14 (ref 12–20)
CALCIUM SERPL-MCNC: 8.1 MG/DL (ref 8.5–10.1)
CHLORIDE SERPL-SCNC: 108 MMOL/L (ref 97–108)
CO2 SERPL-SCNC: 26 MMOL/L (ref 21–32)
CREAT SERPL-MCNC: 0.59 MG/DL (ref 0.55–1.02)
GLUCOSE SERPL-MCNC: 124 MG/DL (ref 65–100)
HGB BLD-MCNC: 12 G/DL (ref 11.5–16)
POTASSIUM SERPL-SCNC: 3.9 MMOL/L (ref 3.5–5.1)
SODIUM SERPL-SCNC: 139 MMOL/L (ref 136–145)

## 2021-09-08 PROCEDURE — 36415 COLL VENOUS BLD VENIPUNCTURE: CPT

## 2021-09-08 PROCEDURE — 74011000250 HC RX REV CODE- 250: Performed by: ORTHOPAEDIC SURGERY

## 2021-09-08 PROCEDURE — 74011250637 HC RX REV CODE- 250/637: Performed by: PHYSICIAN ASSISTANT

## 2021-09-08 PROCEDURE — 80048 BASIC METABOLIC PNL TOTAL CA: CPT

## 2021-09-08 PROCEDURE — 74011250636 HC RX REV CODE- 250/636: Performed by: NURSE PRACTITIONER

## 2021-09-08 PROCEDURE — 74011250636 HC RX REV CODE- 250/636: Performed by: ORTHOPAEDIC SURGERY

## 2021-09-08 PROCEDURE — 85018 HEMOGLOBIN: CPT

## 2021-09-08 PROCEDURE — 74011250637 HC RX REV CODE- 250/637: Performed by: ORTHOPAEDIC SURGERY

## 2021-09-08 RX ORDER — HYDROXYZINE 25 MG/1
50 TABLET, FILM COATED ORAL 2 TIMES DAILY
Status: DISCONTINUED | OUTPATIENT
Start: 2021-09-08 | End: 2021-09-08 | Stop reason: HOSPADM

## 2021-09-08 RX ORDER — DEXAMETHASONE SODIUM PHOSPHATE 10 MG/ML
10 INJECTION INTRAMUSCULAR; INTRAVENOUS ONCE
Status: COMPLETED | OUTPATIENT
Start: 2021-09-08 | End: 2021-09-08

## 2021-09-08 RX ORDER — OXYCODONE HYDROCHLORIDE 5 MG/1
5-10 TABLET ORAL
Qty: 50 TABLET | Refills: 0 | Status: SHIPPED | OUTPATIENT
Start: 2021-09-08 | End: 2021-09-15

## 2021-09-08 RX ORDER — CYCLOBENZAPRINE HCL 10 MG
10 TABLET ORAL
Qty: 30 TABLET | Refills: 0 | Status: SHIPPED | OUTPATIENT
Start: 2021-09-08

## 2021-09-08 RX ORDER — AMOXICILLIN 250 MG
1 CAPSULE ORAL 2 TIMES DAILY
Qty: 60 TABLET | Refills: 0 | Status: SHIPPED | OUTPATIENT
Start: 2021-09-08

## 2021-09-08 RX ADMIN — OXYCODONE 10 MG: 5 TABLET ORAL at 09:40

## 2021-09-08 RX ADMIN — FAMOTIDINE 20 MG: 20 TABLET ORAL at 09:28

## 2021-09-08 RX ADMIN — CEFAZOLIN 2 G: 1 INJECTION, POWDER, FOR SOLUTION INTRAMUSCULAR; INTRAVENOUS at 07:16

## 2021-09-08 RX ADMIN — LEVOTHYROXINE SODIUM 150 MCG: 0.15 TABLET ORAL at 07:16

## 2021-09-08 RX ADMIN — GABAPENTIN 400 MG: 100 CAPSULE ORAL at 15:45

## 2021-09-08 RX ADMIN — OXYCODONE 10 MG: 5 TABLET ORAL at 14:03

## 2021-09-08 RX ADMIN — CYCLOBENZAPRINE 10 MG: 10 TABLET, FILM COATED ORAL at 09:17

## 2021-09-08 RX ADMIN — OXYCODONE 10 MG: 5 TABLET ORAL at 05:10

## 2021-09-08 RX ADMIN — GABAPENTIN 400 MG: 100 CAPSULE ORAL at 09:07

## 2021-09-08 RX ADMIN — HYDROXYZINE HYDROCHLORIDE 50 MG: 25 TABLET, FILM COATED ORAL at 10:38

## 2021-09-08 RX ADMIN — SODIUM CHLORIDE 125 ML/HR: 9 INJECTION, SOLUTION INTRAVENOUS at 05:00

## 2021-09-08 RX ADMIN — DEXAMETHASONE SODIUM PHOSPHATE 10 MG: 10 INJECTION, SOLUTION INTRAMUSCULAR; INTRAVENOUS at 09:28

## 2021-09-08 RX ADMIN — CYCLOBENZAPRINE 10 MG: 10 TABLET, FILM COATED ORAL at 15:45

## 2021-09-08 RX ADMIN — DULOXETINE HYDROCHLORIDE 60 MG: 30 CAPSULE, DELAYED RELEASE ORAL at 09:09

## 2021-09-08 RX ADMIN — DOCUSATE SODIUM 50MG AND SENNOSIDES 8.6MG 1 TABLET: 8.6; 5 TABLET, FILM COATED ORAL at 09:28

## 2021-09-08 RX ADMIN — POLYETHYLENE GLYCOL 3350 17 G: 17 POWDER, FOR SOLUTION ORAL at 09:27

## 2021-09-08 NOTE — DISCHARGE INSTRUCTIONS
Abril Haynes MD  [facility]  Office Phone: [phone]  Neck Surgery Discharge Instructions    Activities   You are going home a well person, be as active as possible. Your only exercise should be walking. Start with short frequent walks and increase your walking distance each day. Start with walking twice a day for 5 minutes and increase your distance each day 2-3 minutes until you reach 25 minutes twice a day. Limit the amount of time you sit to 20-30 minute intervals. Sitting for prolonged periods of time will be uncomfortable for you following your surgery.  Do not lift anything over five pounds, and do not do any bending or straining.  Avoid reaching overhead in this post-operative period   Do not do any neck exercises until you have been instructed by your doctor.  When you are in the bed, you may lay on your back or on either side. Do not lie on your stomach.  Continue using your incentive spirometer regularly for deep breathing exercises   You may resume sexual relations 3-4 weeks after your surgery, depending on how you are feeling. Diet   You may resume your normal diet. If your throat is sore, you may want to eat soft foods for a few days. Be sure to drink plenty of fluids, it is important to keep yourself hydrated. If you begin having trouble swallowing, call the office immediately.  Avoid alcoholic beverages and ABSOLUTELY NO tobacco products. Tobacco products will interfere with your healing. If you continue to use tobacco, you may end up needing another surgery in the future. Medications   Do not take anti-inflammatory medications or aspirin unless instructed by your physician.  Take your pain medication as directed.  Do NOT take additional Tylenol if your prescribed pain medication has acetaminophen in it (Endocet/Percocet, Lortab, Norco).  It is important to have regular bowel movements. Pain medications may cause constipation.   Stool softeners, prune juice, and increasing your water and fiber intake may help in preventing constipation.  Do NOT take laxatives if at all possible except in severe situations. It can results in a vicious cycle of constipation and diarrhea.  Do not be alarmed if you still have some of the same symptoms you had prior to surgery. The nerves often require time to heal after the pressure has been relieved. You may experience pain in your shoulders or between your shoulder blades, which is common after this surgery. The level of pain you experience should improve as your body heals. Driving   You may not drive or return to work until instructed by your physician. However, you may ride in the car for short periods of time. Neck collar   Wear your neck brace. You may remove it for short breaks, when eating or showering. You must keep the brace on while sleeping and when ambulating. Showering   You may shower in approximately 5 days after your surgery if your incision is not draining.  You may remove your brace during showers.  Do not rub or apply lotion or ointments to the incision site.  Do not use tub baths, swimming pools or Jacuzzis. Caring for your incision   Keep the clear, plastic dressing on until 3 days after surgery. At that point, if the incision is dry and without drainage, you may keep the wound open to air without cover.  You may have steri-strips on your incision (small, white pieces of tape). Do not pull the steri-strips; they will fall off on their own after several days. If you have sutures or staples, they will be removed by home health or when you see your physician.  Do not rub or apply any lotions or ointments to your incision site. Follow Up   Once you are home, call your physicians office to schedule an appointment 2-3 weeks after surgery.     Notify your physician if you develop any of the following conditions:   Fever above 101 degrees for 24 hours.   Nausea or vomiting.  Severe headache.  Inability to urinate.  Loss of bowel or bladder control (sudden onset of incontinence).  Changes in sensation in your extremities (numbness, tingling, loss of color).  Severe pain or pain not relieved by medications.  Redness, swelling, or drainage from your incision.  Persistent pain in the chest.    Pain in the calf of either leg.  Increased weakness (if this is greater than before your surgery). If you have any questions, contact your Orthopaedic Surgeons office. OFFICE OF DR. Zeke Love   [phone]  [place] [id number] [address], [address], [address] [facility] [facility], [address] [facility] [place]     * WEAR YOUR BRACE AS ADVISED    * NO DRIVING UNTIL YOU ARE CLEARED TO DO SO BY YOUR SURGEON    * LIMIT LIFTING, BENDING AND TWISTING.  NO LIFTING MORE THAN 5 LBS    * MAKE SURE YOU ARE GETTING GOOD NUTRITION (Lean Protein, Vitamin D AND Calcium)    * DO NOT TAKE ANY NSAIDs FOR THE FIRST 3 MONTHS AFTER SURGERY (such as Advil/Ibuprofen/Motrin, Aleve/Naproxen/Naprosyn, Diclofenac, Celebrex, Meloxicam, Indomethacin, Goody's powder, BC powder etc.)    * NO NICOTINE PRODUCTS    * FULLY READ YOUR DISCHARGE INSTRUCTIONS

## 2021-09-08 NOTE — PERIOP NOTES
Handoff report given by Precious Valles RN.  PT. Returned from restroom, watching TV. No voiced c/o at this time.

## 2021-11-01 RX ORDER — HYDROXYZINE PAMOATE 50 MG/1
CAPSULE ORAL
Qty: 60 CAPSULE | Refills: 2 | Status: SHIPPED | OUTPATIENT
Start: 2021-11-01 | End: 2022-01-12

## 2022-01-12 RX ORDER — HYDROXYZINE PAMOATE 50 MG/1
CAPSULE ORAL
Qty: 60 CAPSULE | Refills: 2 | Status: SHIPPED | OUTPATIENT
Start: 2022-01-12

## 2022-02-16 RX ORDER — DULOXETIN HYDROCHLORIDE 60 MG/1
CAPSULE, DELAYED RELEASE ORAL
Qty: 30 CAPSULE | Refills: 5 | Status: SHIPPED | OUTPATIENT
Start: 2022-02-16

## 2022-03-19 PROBLEM — E89.0 POSTOPERATIVE HYPOTHYROIDISM: Status: ACTIVE | Noted: 2018-01-08

## 2022-03-19 PROBLEM — M48.02 CERVICAL STENOSIS OF SPINAL CANAL: Status: ACTIVE | Noted: 2021-09-07

## 2022-03-19 PROBLEM — M48.062 LUMBAR STENOSIS WITH NEUROGENIC CLAUDICATION: Status: ACTIVE | Noted: 2021-04-26

## 2022-03-19 PROBLEM — C73 THYROID CANCER (HCC): Status: ACTIVE | Noted: 2018-06-19

## 2022-04-05 ENCOUNTER — DOCUMENTATION ONLY (OUTPATIENT)
Dept: ENDOCRINOLOGY | Age: 37
End: 2022-04-05

## 2022-04-05 NOTE — PROGRESS NOTES
Left voicemail for return call. Patient has several numbers on file for contact. Unable to get an answer on any numbers provided. Per  and MD; patient is to be contacted regarding missed appointments.

## 2022-12-05 NOTE — ANESTHESIA POSTPROCEDURE EVALUATION
Procedure(s):  C6 CORPECTOMY, C5-C7 FUSION WITH INSTRUMENTATION AND BONEGRAFT (LATEX ALLERGY). general    Anesthesia Post Evaluation        Patient location during evaluation: PACU  Level of consciousness: awake  Pain management: adequate  Airway patency: patent  Anesthetic complications: no  Cardiovascular status: acceptable  Respiratory status: acceptable  Hydration status: acceptable  Post anesthesia nausea and vomiting:  none      INITIAL Post-op Vital signs:   Vitals Value Taken Time   /84 09/07/21 1530   Temp 36.4 °C (97.6 °F) 09/07/21 1240   Pulse 108 09/07/21 1542   Resp 17 09/07/21 1542   SpO2 94 % 09/07/21 1606   Vitals shown include unvalidated device data. 98

## 2023-05-24 RX ORDER — DULOXETIN HYDROCHLORIDE 60 MG/1
1 CAPSULE, DELAYED RELEASE ORAL DAILY
COMMUNITY
Start: 2022-02-16

## 2023-05-24 RX ORDER — CYCLOBENZAPRINE HCL 10 MG
10 TABLET ORAL 3 TIMES DAILY PRN
COMMUNITY
Start: 2021-09-08

## 2023-05-24 RX ORDER — HYDROXYZINE PAMOATE 50 MG/1
CAPSULE ORAL
COMMUNITY
Start: 2022-01-12

## 2023-05-24 RX ORDER — AMOXICILLIN 250 MG
1 CAPSULE ORAL 2 TIMES DAILY
COMMUNITY
Start: 2021-09-08

## 2023-05-24 RX ORDER — GABAPENTIN 400 MG/1
400 CAPSULE ORAL 3 TIMES DAILY
COMMUNITY

## 2023-05-24 RX ORDER — LEVOTHYROXINE SODIUM 0.15 MG/1
TABLET ORAL
COMMUNITY
Start: 2021-05-05
